# Patient Record
Sex: FEMALE | Race: WHITE
[De-identification: names, ages, dates, MRNs, and addresses within clinical notes are randomized per-mention and may not be internally consistent; named-entity substitution may affect disease eponyms.]

---

## 2017-05-04 ENCOUNTER — HOSPITAL ENCOUNTER (EMERGENCY)
Dept: HOSPITAL 62 - ER | Age: 57
LOS: 1 days | Discharge: HOME | End: 2017-05-05
Payer: COMMERCIAL

## 2017-05-04 DIAGNOSIS — R06.02: ICD-10-CM

## 2017-05-04 DIAGNOSIS — R05: ICD-10-CM

## 2017-05-04 DIAGNOSIS — F17.210: ICD-10-CM

## 2017-05-04 DIAGNOSIS — J20.9: Primary | ICD-10-CM

## 2017-05-04 PROCEDURE — 99285 EMERGENCY DEPT VISIT HI MDM: CPT

## 2017-05-04 PROCEDURE — 94640 AIRWAY INHALATION TREATMENT: CPT

## 2017-05-04 PROCEDURE — 93005 ELECTROCARDIOGRAM TRACING: CPT

## 2017-05-04 PROCEDURE — 93010 ELECTROCARDIOGRAM REPORT: CPT

## 2017-05-04 PROCEDURE — 71010: CPT

## 2017-05-04 NOTE — ER DOCUMENT REPORT
ED Respiratory Problem





- General


Mode of Arrival: Ambulatory


Information source: Patient


TRAVEL OUTSIDE OF THE [place] IN LAST 30 DAYS: No





- HPI


Patient complains to provider of: Short of breath


Onset: Yesterday


Duration: Worse/persistent


Quality of pain: No pain


Short of Breath: Mild


Cough: Nonproductive


Associated symptoms: None


Similar symptoms previously: Yes





- General


Chief Complaint: Shortness Of Breath


Stated Complaint: DIFFICULTY BREATHING


Notes: 


Patient is a 57-year-old female that presents to the emergency department today 

with complaints of shortness of breath with a mild cough which began yesterday.

  Patient states annually she gets bronchitis with associated shortness of 

breath which presents in this fashion.  Patient states she has used prednisone 

with an albuterol inhaler in the past which has worked well for her in the 

past.  Patient denies any chest pain or history of cardiac disease. (CARLOS EVANS)





- Related Data


Allergies/Adverse Reactions: 


 





No Known Allergies Allergy (Verified 05/23/16 10:49)


 











Past Medical History





- General


Information source: Patient





- Social History


Smoking Status: Current Every Day Smoker


Cigarette use (# per day): Yes


Frequency of alcohol use: None


Drug Abuse: None


Lives with: Family


Family History: Reviewed & Not Pertinent


Patient has suicidal ideation: No


Patient has homicidal ideation: No





- Past Medical History


Cardiac Medical History: Reports: Hx Hypertension


Past Surgical History: Reports: Hx Cholecystectomy, Hx Oral Surgery, Hx Tubal 

Ligation





- Immunizations


Hx Diphtheria, Pertussis, Tetanus Vaccination: Yes





Review of Systems





- Review of Systems


Constitutional: No symptoms reported


EENT: No symptoms reported


Cardiovascular: denies: Chest pain


Respiratory: See HPI, Cough, Short of breath


Gastrointestinal: No symptoms reported


Genitourinary: No symptoms reported


Female Genitourinary: No symptoms reported


Musculoskeletal: No symptoms reported


Skin: No symptoms reported


Hematologic/Lymphatic: No symptoms reported


Neurological/Psychological: No symptoms reported


-: Yes All other systems reviewed and negative





Physical Exam





- Vital signs


Vitals: 


 











Temp Pulse Resp BP Pulse Ox


 


 98.3 F   93   18   187/94 H  98 


 


 05/04/17 21:59  05/04/17 21:59  05/04/17 21:59  05/04/17 21:59  05/04/17 21:59














- Notes


Notes: 


Physical Exam:


 


General: Alert, appears well. 


 


HEENT: Normocephalic. Atraumatic. PERRL. Extraocular movements intact. 

Oropharynx clear. TMs are clear bilaterally. 


 


Neck: Supple. Non-tender.


 


Respiratory: No respiratory distress.  Expiratory wheezing bilaterally.  





Cardiovascular: Regular rate and rhythm. 


 


Abdominal: Normal Inspection. Non-tender. No distension. Normal Bowel Sounds. 


 


Back: Non-tender. No deformity or step off.


 


Extremities: Moves all four extremities.


Upper extremities: Normal inspection. Normal ROM.  


Lower extremities: Normal inspection. No edema. Normal ROM.


 


Neurological: Normal cognition. AAOx4. Normal speech.  


 


Psychological: Normal affect. Normal Mood. 


 


Skin: Warm. Dry. Normal color.  (CARLOS EVANS)





Course





- Re-evaluation


Re-evalutation: 


05/05/17 02:09


Female smoker presents the emergency room chief complaint of cough and 

shortness of breath. She says started about 3-4 days ago mild cough 

nonproductive of sputum. Says she can hear herself wheezing it gets worse when 

she ambulates. She vehemently denies any cardiac history this is nonexertional 

in nature. She denies any chest pain pressure shortness of breath on exertion 

lower extremity edema calf tenderness swelling history DVT or pulmonary emboli. 

No fevers chills chest pain pressure back pain abdominal pain nausea vomiting 

or diarrhea. On examination she is afebrile not hypoxic in no acute respiratory 

distress does have expiratory wheezes. Chest x-ray is negative for acute 

pathology improvement with albuterol and prednisone. Does have a history of 

smoking is not been diagnosed with COPD but is had bronchitis in the past. When 

ahead and started her on prednisone and albuterol inhaler gave her primary care 

physician follow-up in 2-3 days and discussed reasons for ED return sooner





05/05/17 02:53


 (MICHELLE OSPINA)





- Vital Signs


Vital signs: 


 











Temp Pulse Resp BP Pulse Ox


 


 98.3 F   88   20   144/71 H  98 


 


 05/04/17 21:59  05/05/17 01:46  05/05/17 01:46  05/05/17 01:46  05/04/17 21:59














- EKG Interpretation by Me


Additional EKG results interpreted by me: 





05/05/17 00:59


EKG interpreted by myself to reveal sinus rhythm at 76 bpm no acute ST segment 

elevation or depression (MICHELLE OSPINA)





Discharge





- Discharge


Clinical Impression: 


 Bronchitis acute, tobacco abuse





Condition: Stable


Disposition: HOME, SELF-CARE


Additional Instructions: 


Bronchitis





     You have acute bronchitis.  This disease is an infection or inflammation 

of the air passageways in your lungs.  Symptoms usually include cough, low 

grade fever, shortness of breath, and wheezing.  The cough usually persists for 

a couple of weeks.


     Most cases of bronchitis get better without antibiotics.  We prescribe 

antibiotics when we believe bacteria are damaging your airways, or if there's 

high risk the bronchitis will worsen into pneumonia.


     Increase your fluid intake. A cool mist humidifier may make your lungs 

more comfortable.  An expectorant (cough medicine that loosens phlegm) can 

help.  If you smoke, STOP!!!  Recovery from bronchitis can be somewhat slow, 

but you should see improvement within a day or two.


     Repeated episodes of bronchitis may result in lung damage -- for example, 

chronic bronchitis, recurrent pneumonias, or emphysema.


     Call the doctor if you develop increasing fever, shortness of breath, 

chest pain, bloody sputum, or otherwise worsen.  If you have not improved at 

all after several days, contact the physician.








Prescriptions: 


Albuterol Sulfate [Proair HFA Inhalation Aerosol 8.5 gm MDI] 2 puff IH Q4H PRN #

1 mdi


 PRN Reason: 


Prednisone [Deltasone 20 mg Tablet] 3 tab PO DAILY 5 Days


Forms:  Smoking Cessation Education


Referrals: 


[facility] [Provider Group] (Call in a.m. for appointment to be 

seen in follow-up in 3-4 days return for increasing worsening or new symptoms)


Scribe Attestation: 





05/05/17 00:21


I personally performed the services described in the documentation reviewed the 

documentation recorded by my scribe in my presence and it accurately and 

completely records my words and actions (MICHELLE OSPINA)





Scribe Documentation





- Scribe


Written by Khadra:: Khadra Romero, 5/5/2017 0228


acting as scribe for :: Bernardino

## 2017-05-05 VITALS — SYSTOLIC BLOOD PRESSURE: 144 MMHG | DIASTOLIC BLOOD PRESSURE: 71 MMHG

## 2017-10-11 ENCOUNTER — HOSPITAL ENCOUNTER (EMERGENCY)
Dept: HOSPITAL 62 - ER | Age: 57
Discharge: HOME | End: 2017-10-11
Payer: COMMERCIAL

## 2017-10-11 VITALS — SYSTOLIC BLOOD PRESSURE: 153 MMHG | DIASTOLIC BLOOD PRESSURE: 108 MMHG

## 2017-10-11 DIAGNOSIS — R07.9: ICD-10-CM

## 2017-10-11 DIAGNOSIS — J40: Primary | ICD-10-CM

## 2017-10-11 DIAGNOSIS — R06.02: ICD-10-CM

## 2017-10-11 DIAGNOSIS — R05: ICD-10-CM

## 2017-10-11 DIAGNOSIS — F17.200: ICD-10-CM

## 2017-10-11 PROCEDURE — 99283 EMERGENCY DEPT VISIT LOW MDM: CPT

## 2017-10-11 NOTE — ER DOCUMENT REPORT
ED General





- General


Chief Complaint: Cough


Stated Complaint: COUGH DIFFICULTY BREATHING


Time Seen by Provider: 10/11/17 12:34


Notes: 





58 yo female with hx/o bronchitis, HTN presents to ED c/o cough, chest 

tightness and shortness of breath.  Pt reports she gets bronchitis 1-2x/y.  

These symptoms are familiar.  pt denies any chest pain.  no fever.  feels 

otherwise OK.  + smoker


TRAVEL OUTSIDE OF THE [place] IN LAST 30 DAYS: No





- HPI


Onset: Other - 2days


Onset/Duration: Gradual, Persistent


Quality of pain: No pain


Associated symptoms: Nonproductive cough, Shortness of breath.  denies: Sinus 

pain/drainage


Exacerbated by: Movement, Walking


Relieved by: Denies


Similar symptoms previously: Yes


Recently seen / treated by doctor: No





- Related Data


Allergies/Adverse Reactions: 


 





No Known Allergies Allergy (Verified 10/11/17 11:21)


 











Past Medical History





- General


Information source: Patient





- Social History


Smoking Status: Current Some Day Smoker


Chew tobacco use (# tins/day): No


Frequency of alcohol use: None


Drug Abuse: None


Lives with: Family


Family History: Reviewed & Not Pertinent





- Past Medical History


Cardiac Medical History: Reports: Hx Hypertension


Pulmonary Medical History: Reports: Hx Bronchitis


Renal/ Medical History: Denies: Hx Peritoneal Dialysis


Past Surgical History: Reports: Hx Cholecystectomy, Hx Oral Surgery - jaw, Hx 

Orthopedic Surgery - back, Hx Tubal Ligation





- Immunizations


Hx Diphtheria, Pertussis, Tetanus Vaccination: Yes





Review of Systems





- Review of Systems


Constitutional: No symptoms reported


EENT: No symptoms reported


Cardiovascular: No symptoms reported


Respiratory: See HPI, Cough


Gastrointestinal: No symptoms reported


Genitourinary: No symptoms reported


Female Genitourinary: No symptoms reported


Musculoskeletal: No symptoms reported


Skin: No symptoms reported


Hematologic/Lymphatic: No symptoms reported


Neurological/Psychological: No symptoms reported


-: Yes All other systems reviewed and negative





Physical Exam





- Vital signs


Vitals: 


 











Temp Pulse Resp BP Pulse Ox


 


 99.1 F   112 H  19   153/108 H  94 


 


 10/11/17 11:18  10/11/17 11:18  10/11/17 11:18  10/11/17 11:18  10/11/17 11:18











Interpretation: Normal





- General


General appearance: Appears well, Alert





- HEENT


Head: Normocephalic, Atraumatic


Eyes: Normal


Pupils: PERRL





- Respiratory


Respiratory status: No respiratory distress


Chest status: Nontender


Breath sounds: Decreased air movement, Nonproductive cough, Wheezing


Chest palpation: Normal





- Cardiovascular


Rhythm: Regular


Heart sounds: Normal auscultation


Murmur: No





- Abdominal


Inspection: Normal


Distension: No distension


Bowel sounds: Normal


Tenderness: Nontender


Organomegaly: No organomegaly





- Back


Back: Normal, Nontender





- Extremities


General upper extremity: Normal inspection, Nontender, Normal color, Normal ROM

, Normal temperature


General lower extremity: Normal inspection, Nontender, Normal color, Normal ROM

, Normal temperature, Normal weight bearing.  No: Simin's sign





- Neurological


Neuro grossly intact: Yes


Cognition: Normal


Orientation: AAOx4


Margot Coma Scale Eye Opening: Spontaneous


Margot Coma Scale Verbal: Oriented


Redding Coma Scale Motor: Obeys Commands


Margot Coma Scale Total: 15


Speech: Normal


Motor strength normal: LUE, RUE, LLE, RLE


Sensory: Normal





- Psychological


Associated symptoms: Normal affect, Normal mood





- Skin


Skin Temperature: Warm


Skin Moisture: Dry


Skin Color: Normal





Course





- Re-evaluation


Re-evalutation: 





10/11/17 12:51


pt is a 58 yo heavy smoker with  complaints of cough and shortness of breath.  

symptoms started about 2days ago with mild cough,nonproductive.  Pt denies 

cardiac history.  She denies any chest pain pressure, lower extremity edema, 

calf tenderness, swelling or history DVT or pulmonary emboli. No fevers, chills

, chest pain or pressure,  back pain,  abdominal pain,  nausea vomiting or 

diarrhea. On examination she is afebrile not hypoxic in no acute respiratory 

distress does have expiratory wheezes. Pt offered a chest xray and neb treatment

, but she declines both.  says this is familiar to her and she is catching it 

early.  Pt does have a history of smoking but is making concerted effort to 

quit.  Down from 1ppd to 1ppweek,  Pt has not been diagnosed with COPD but has 

had bronchitis many times in the past. Will treat with oral steroid, inhaled 

bronchidilator.  I do not feel there is a current infectious process, but will 

write for a course of antibiotic which pt is instructed to start if condition 

worsens.  pt acknowledges understanding and is agreeable with plan.  pt is 

stable for discharge and f/u with pcm.  discussed reasons for ED return 











- Vital Signs


Vital signs: 


 











Temp Pulse Resp BP Pulse Ox


 


 99.1 F   112 H  19   153/108 H  94 


 


 10/11/17 11:18  10/11/17 11:18  10/11/17 11:18  10/11/17 11:18  10/11/17 11:18














Discharge





- Discharge


Clinical Impression: 


 Bronchitis





Condition: Stable


Disposition: HOME, SELF-CARE


Instructions:  Antibiotic Therapy ([facility]), Bronchitis With Bronchospasm (Wheezing

) ([facility]), Inhaled Bronchodilators ([facility]), Steroid Medication


Additional Instructions: 


You are being treated for bronchitis


Take medications as prescribed


Hold antibiotic and take only if your condition worsens


Humidified air


continue your efforts to stop smoking


Prescriptions: 


Albuterol Sulfate [Proair HFA Inhalation Aerosol 8.5 gm MDI] 2 puff IH Q4H PRN #

1 mdi


 PRN Reason: 


Azithromycin [Zithromax 250 mg Tablet] 250 mg PO ASDIR #6 tablet


Prednisone [Deltasone 10 mg Tablet] 10 mg PO ASDIR PRN #21 tablet


 PRN Reason: 


Forms:  Elevated Blood Pressure

## 2017-10-12 ENCOUNTER — HOSPITAL ENCOUNTER (OUTPATIENT)
Dept: HOSPITAL 62 - ER | Age: 57
Setting detail: OBSERVATION
LOS: 1 days | Discharge: HOME | End: 2017-10-13
Attending: INTERNAL MEDICINE | Admitting: INTERNAL MEDICINE
Payer: SELF-PAY

## 2017-10-12 DIAGNOSIS — F17.210: ICD-10-CM

## 2017-10-12 DIAGNOSIS — Z79.52: ICD-10-CM

## 2017-10-12 DIAGNOSIS — J96.01: ICD-10-CM

## 2017-10-12 DIAGNOSIS — R73.9: ICD-10-CM

## 2017-10-12 DIAGNOSIS — J44.1: Primary | ICD-10-CM

## 2017-10-12 DIAGNOSIS — R00.0: ICD-10-CM

## 2017-10-12 DIAGNOSIS — I16.0: ICD-10-CM

## 2017-10-12 DIAGNOSIS — Z98.51: ICD-10-CM

## 2017-10-12 DIAGNOSIS — F41.8: ICD-10-CM

## 2017-10-12 DIAGNOSIS — Z90.49: ICD-10-CM

## 2017-10-12 DIAGNOSIS — J34.89: ICD-10-CM

## 2017-10-12 DIAGNOSIS — T38.0X5A: ICD-10-CM

## 2017-10-12 DIAGNOSIS — Z79.899: ICD-10-CM

## 2017-10-12 LAB
ALBUMIN SERPL-MCNC: 4.7 G/DL (ref 3.5–5)
ALP SERPL-CCNC: 206 U/L (ref 38–126)
ALT SERPL-CCNC: 31 U/L (ref 9–52)
ANION GAP SERPL CALC-SCNC: 14 MMOL/L (ref 5–19)
APPEARANCE UR: CLEAR
APTT BLD: 33 SEC (ref 23.5–35.8)
AST SERPL-CCNC: 33 U/L (ref 14–36)
BASE EXCESS BLDV CALC-SCNC: -2 MMOL/L
BASOPHILS # BLD AUTO: 0.1 10^3/UL (ref 0–0.2)
BASOPHILS NFR BLD AUTO: 0.7 % (ref 0–2)
BILIRUB DIRECT SERPL-MCNC: 0.4 MG/DL (ref 0–0.4)
BILIRUB SERPL-MCNC: 0.5 MG/DL (ref 0.2–1.3)
BILIRUB UR QL STRIP: NEGATIVE
BUN SERPL-MCNC: 6 MG/DL (ref 7–20)
CALCIUM: 9.6 MG/DL (ref 8.4–10.2)
CHLORIDE SERPL-SCNC: 108 MMOL/L (ref 98–107)
CO2 SERPL-SCNC: 27 MMOL/L (ref 22–30)
CREAT SERPL-MCNC: 0.81 MG/DL (ref 0.52–1.25)
EOSINOPHIL # BLD AUTO: 0 10^3/UL (ref 0–0.6)
EOSINOPHIL NFR BLD AUTO: 0.1 % (ref 0–6)
ERYTHROCYTE [DISTWIDTH] IN BLOOD BY AUTOMATED COUNT: 14.2 % (ref 11.5–14)
GLUCOSE SERPL-MCNC: 144 MG/DL (ref 75–110)
GLUCOSE UR STRIP-MCNC: NEGATIVE MG/DL
HCO3 BLDV-SCNC: 24 MMOL/L (ref 20–32)
HCT VFR BLD CALC: 45 % (ref 36–47)
HGB BLD-MCNC: 15.4 G/DL (ref 12–15.5)
HGB HCT DIFFERENCE: 1.2
KETONES UR STRIP-MCNC: NEGATIVE MG/DL
LYMPHOCYTES # BLD AUTO: 0.8 10^3/UL (ref 0.5–4.7)
LYMPHOCYTES NFR BLD AUTO: 7.3 % (ref 13–45)
MCH RBC QN AUTO: 32 PG (ref 27–33.4)
MCHC RBC AUTO-ENTMCNC: 34.2 G/DL (ref 32–36)
MCV RBC AUTO: 94 FL (ref 80–97)
MONOCYTES # BLD AUTO: 0.9 10^3/UL (ref 0.1–1.4)
MONOCYTES NFR BLD AUTO: 7.4 % (ref 3–13)
NEUTROPHILS # BLD AUTO: 9.7 10^3/UL (ref 1.7–8.2)
NEUTS SEG NFR BLD AUTO: 84.5 % (ref 42–78)
NITRITE UR QL STRIP: NEGATIVE
PCO2 BLDV: 45.2 MMHG (ref 35–63)
PH BLDV: 7.34 [PH] (ref 7.3–7.42)
PH UR STRIP: 6 [PH] (ref 5–9)
POTASSIUM SERPL-SCNC: 3.7 MMOL/L (ref 3.6–5)
PROT SERPL-MCNC: 8.4 G/DL (ref 6.3–8.2)
PROT UR STRIP-MCNC: NEGATIVE MG/DL
PROTHROMBIN TIME: 12.4 SEC (ref 11.4–15.4)
PROTHROMBIN TIME: 13.2 SEC (ref 11.4–15.4)
RBC # BLD AUTO: 4.81 10^6/UL (ref 3.72–5.28)
SODIUM SERPL-SCNC: 149 MMOL/L (ref 137–145)
SP GR UR STRIP: 1
UROBILINOGEN UR-MCNC: NEGATIVE MG/DL (ref ?–2)
WBC # BLD AUTO: 11.5 10^3/UL (ref 4–10.5)

## 2017-10-12 PROCEDURE — 81001 URINALYSIS AUTO W/SCOPE: CPT

## 2017-10-12 PROCEDURE — 36415 COLL VENOUS BLD VENIPUNCTURE: CPT

## 2017-10-12 PROCEDURE — 85025 COMPLETE CBC W/AUTO DIFF WBC: CPT

## 2017-10-12 PROCEDURE — 99406 BEHAV CHNG SMOKING 3-10 MIN: CPT

## 2017-10-12 PROCEDURE — 93005 ELECTROCARDIOGRAM TRACING: CPT

## 2017-10-12 PROCEDURE — 99291 CRITICAL CARE FIRST HOUR: CPT

## 2017-10-12 PROCEDURE — 87040 BLOOD CULTURE FOR BACTERIA: CPT

## 2017-10-12 PROCEDURE — 96360 HYDRATION IV INFUSION INIT: CPT

## 2017-10-12 PROCEDURE — 84100 ASSAY OF PHOSPHORUS: CPT

## 2017-10-12 PROCEDURE — 71010: CPT

## 2017-10-12 PROCEDURE — 87086 URINE CULTURE/COLONY COUNT: CPT

## 2017-10-12 PROCEDURE — 82803 BLOOD GASES ANY COMBINATION: CPT

## 2017-10-12 PROCEDURE — 80061 LIPID PANEL: CPT

## 2017-10-12 PROCEDURE — 71275 CT ANGIOGRAPHY CHEST: CPT

## 2017-10-12 PROCEDURE — HZ31ZZZ INDIVIDUAL COUNSELING FOR SUBSTANCE ABUSE TREATMENT, BEHAVIORAL: ICD-10-PCS | Performed by: EMERGENCY MEDICINE

## 2017-10-12 PROCEDURE — 94640 AIRWAY INHALATION TREATMENT: CPT

## 2017-10-12 PROCEDURE — 85730 THROMBOPLASTIN TIME PARTIAL: CPT

## 2017-10-12 PROCEDURE — 93010 ELECTROCARDIOGRAM REPORT: CPT

## 2017-10-12 PROCEDURE — G0378 HOSPITAL OBSERVATION PER HR: HCPCS

## 2017-10-12 PROCEDURE — 3E0F7GC INTRODUCTION OF OTHER THERAPEUTIC SUBSTANCE INTO RESPIRATORY TRACT, VIA NATURAL OR ARTIFICIAL OPENING: ICD-10-PCS | Performed by: EMERGENCY MEDICINE

## 2017-10-12 PROCEDURE — 84443 ASSAY THYROID STIM HORMONE: CPT

## 2017-10-12 PROCEDURE — 83605 ASSAY OF LACTIC ACID: CPT

## 2017-10-12 PROCEDURE — 87070 CULTURE OTHR SPECIMN AEROBIC: CPT

## 2017-10-12 PROCEDURE — 87205 SMEAR GRAM STAIN: CPT

## 2017-10-12 PROCEDURE — 80053 COMPREHEN METABOLIC PANEL: CPT

## 2017-10-12 PROCEDURE — 85610 PROTHROMBIN TIME: CPT

## 2017-10-12 PROCEDURE — 83880 ASSAY OF NATRIURETIC PEPTIDE: CPT

## 2017-10-12 PROCEDURE — 83735 ASSAY OF MAGNESIUM: CPT

## 2017-10-12 RX ADMIN — METHYLPREDNISOLONE SODIUM SUCCINATE SCH MG: 40 INJECTION, POWDER, FOR SOLUTION INTRAMUSCULAR; INTRAVENOUS at 21:51

## 2017-10-12 RX ADMIN — IPRATROPIUM BROMIDE AND ALBUTEROL SULFATE SCH ML: 2.5; .5 SOLUTION RESPIRATORY (INHALATION) at 20:54

## 2017-10-12 NOTE — ER DOCUMENT REPORT
ED General





- General


Chief Complaint: Breathing Difficulty


Stated Complaint: DIFFICULTY BREATHING


Time Seen by Provider: 10/12/17 14:23


Mode of Arrival: Ambulatory


Information source: Patient


Notes: 





57-year-old female history of smoking presents with complaints of shortness of 

breath and wheezing productive cough of the day duration.  Patient was seen 

here yesterday discharged home with steroids and breathing treatment


Patient notes she has difficulty ambulating due to shortness of breath


TRAVEL OUTSIDE OF THE [place] IN LAST 30 DAYS: No





- HPI


Onset: Last week


Onset/Duration: Worse


Quality of pain: Achy


Severity: Mild


Pain Level: 1


Associated symptoms: Productive cough, Hurts to breath, Shortness of breath


Exacerbated by: Walking, Coughing


Relieved by: Denies


Similar symptoms previously: Yes


Recently seen / treated by doctor: Yes





- Related Data


Allergies/Adverse Reactions: 


 





No Known Allergies Allergy (Verified 10/12/17 14:08)


 











Past Medical History





- Social History


Smoking Status: Current Every Day Smoker


Cigarette use (# per day): Yes


Chew tobacco use (# tins/day): No


Smoking Education Provided: Yes - Patient counselled regarding cessation for 4 

minutes


Family History: Reviewed & Not Pertinent


Patient has suicidal ideation: No





- Past Medical History


Cardiac Medical History: Reports: Hx Hypertension


Pulmonary Medical History: Reports: Hx Bronchitis


Renal/ Medical History: Denies: Hx Peritoneal Dialysis


Past Surgical History: Reports: Hx Cholecystectomy, Hx Oral Surgery - jaw, Hx 

Orthopedic Surgery - back, Hx Tubal Ligation





- Immunizations


Hx Diphtheria, Pertussis, Tetanus Vaccination: Yes





Review of Systems





- Review of Systems


Notes: 





REVIEW OF SYSTEMS:


CONSTITUTIONAL :  Denies fever,  chills, or sweats.  Denies recent illness.


EENT:   Denies eye, ear, throat, or mouth pain or symptoms.  Denies nasal or 

sinus congestion or discharge.  Denies throat, tongue, or mouth swelling or 

difficulty swallowing.


CARDIOVASCULAR:  Denies chest pain.  Denies palpitations or racing or irregular 

heart beat.  Denies ankle edema.


RESPIRATORY: Admits shortness breath difficulty breathing


GASTROINTESTINAL:  Denies abdominal pain or distention.  Denies nausea, vomiting

, or diarrhea.  Denies blood in vomitus, stools, or per rectum.  Denies black, 

tarry stools.  Denies constipation. 


GENITOURINARY:  Denies difficulty urinating, painful urination, burning, 

frequency, blood in urine, or discharge.


FEMALE  GENITOURINARY:  Denies vaginal bleeding, heavy or abnormal periods, 

irregular periods.  Denies vaginal discharge or odor. 


MUSCULOSKELETAL:  Denies back or neck pain or stiffness.  Denies joint pain or 

swelling.


SKIN:   Denies rash, lesions or sores.


HEMATOLOGIC :   Denies easy bruising or bleeding.


LYMPHATIC:  Denies swollen, enlarged glands.


NEUROLOGICAL:  Denies confusion or altered mental status.  Denies passing out 

or loss of consciousness.  Denies dizziness or lightheadedness.  Denies 

headache.  Denies weakness or paralysis or loss of use of either side.  Denies 

problems with gait or speech.  Denies sensory loss, numbness, or tingling.  

Denies seizures.


PSYCHIATRIC:  Denies anxiety or stress.  Denies depression, suicidal ideation, 

or homicidal ideation.





ALL OTHER SYSTEMS REVIEWED AND NEGATIVE.











PHYSICAL EXAMINATION:





GENERAL: Well-appearing, well-nourished and in no acute distress.





HEAD: Atraumatic, normocephalic.





EYES: Pupils equal round and reactive to light, extraocular movements intact, 

conjunctiva are normal.





ENT: Nares patent, oropharynx clear without exudates.  Moist mucous membranes.





NECK: Normal range of motion, supple without lymphadenopathy





LUNGS: Coarse wheezing all throughout.





HEART: Tachycardic





ABDOMEN: Soft, nontender, nondistended abdomen.  No guarding, no rebound.  No 

masses appreciated.





Female : deferred





Musculoskeletal: Normal range of motion, no pitting or edema.  No cyanosis.





NEUROLOGICAL: Cranial nerves grossly intact.  Normal speech, normal gait.  

Normal sensory, motor exams





PSYCH: Normal mood, normal affect.





SKIN: Warm, Dry, normal turgor, no rashes or lesions noted.

















Dictation was performed using Dragon voice recognition software





Physical Exam





- Vital signs


Vitals: 


 











Temp Pulse Resp BP Pulse Ox


 


 98.2 F   130 H  20   191/107 H  89 L


 


 10/12/17 14:08  10/12/17 14:08  10/12/17 14:08  10/12/17 14:08  10/12/17 14:08














Course





- Re-evaluation


Re-evalutation: 





10/12/17 14:40


Patient will be given breathing treatments I believe this is still bronchitis 

lab work pending CTA has been ordered given the level of her tachycardia


10/12/17 15:59


Patient notes some improvement after breathing treatments however continues to 

be tachycardic now in the 130s





- Vital Signs


Vital signs: 


 











Temp Pulse Resp BP Pulse Ox


 


 98.2 F   130 H  20   180/86 H  93 


 


 10/12/17 14:08  10/12/17 14:08  10/12/17 17:01  10/12/17 17:00  10/12/17 17:01














- Laboratory


Result Diagrams: 


 10/12/17 14:30





 10/12/17 14:30


Laboratory results interpreted by me: 


 











  10/12/17 10/12/17 10/12/17





  14:30 14:30 14:46


 


WBC  11.5 H  


 


RDW  14.2 H  


 


Seg Neutrophils %  84.5 H  


 


Lymphocytes %  7.3 L  


 


Absolute Neutrophils  9.7 H  


 


Sodium   149.0 H 


 


Chloride   108 H 


 


BUN   6 L 


 


Glucose   144 H 


 


Alkaline Phosphatase   206 H 


 


Total Protein   8.4 H 


 


Ur Leukocyte Esterase    TRACE H














- Diagnostic Test


Radiology reviewed: Image reviewed, Reports reviewed





Critical Care Note





- Critical Care Note


Total time excluding time spent on procedures (mins): 38


Comments: 





38 minutes of critical care time spent in direct contact evaluating and 

reevaluating the patient, treating symptoms, reviewing labs and studies and 

speaking with family  and consultants excluding any procedures





Discharge





- Discharge


Clinical Impression: 


 COPD exacerbation, Tachycardia, Hypoxemia





Condition: Stable


Disposition: ADMITTED AS INPATIENT


Admitting Provider: Hospitalist


Unit Admitted: Telemetry

## 2017-10-12 NOTE — RADIOLOGY REPORT (SQ)
EXAM DESCRIPTION:  CHEST SINGLE VIEW



COMPLETED DATE/TIME:  10/12/2017 2:51 pm



REASON FOR STUDY:  bed 17 sepsis protocol



COMPARISON:  5/4/2017



EXAM PARAMETERS:  NUMBER OF VIEWS: One view.

TECHNIQUE: Single frontal radiographic view of the chest acquired.

RADIATION DOSE: NA

LIMITATIONS: None.



FINDINGS:  LUNGS AND PLEURA: No opacities, masses or pneumothorax. No pleural effusion.

MEDIASTINUM AND HILAR STRUCTURES: No masses.  Contour normal.

HEART AND VASCULAR STRUCTURES: Heart normal in size.  Normal vasculature.

BONES: No acute findings.

HARDWARE: None in the chest.

OTHER: No other significant finding.



IMPRESSION:  NO ACUTE RADIOGRAPHIC FINDING IN THE CHEST.



TECHNICAL DOCUMENTATION:  JOB ID:  [id number]

## 2017-10-12 NOTE — RADIOLOGY REPORT (SQ)
EXAM DESCRIPTION:  CTA CHEST



COMPLETED DATE/TIME:  10/12/2017 3:48 pm



REASON FOR STUDY:  hypoxemia , tachy



COMPARISON:  None.



TECHNIQUE:  CT scan of the chest performed using helical scanning technique with dynamic intravenous 
contrast injection.  Images reviewed with lung, soft tissue and bone windows.  Reconstructed coronal 
and sagittal MPR images reviewed.

Additional 3 dimensional post-processing performed to develop Maximal Intensity Projection images (MI
P).  All images stored on PACS.

All CT scanners at this facility use dose modulation, iterative reconstruction, and/or weight based d
osing when appropriate to reduce radiation dose to as low as reasonably achievable (ALARA).

[facility]: Dose Right  [facility]: CareDose    [facility]: Dose Right    [facility]: Teradose 4D    [facility]: Smart Technologies



CONTRAST TYPE AND DOSE:  contrast/concentration: Isovue 370.00 mg/ml; Total Contrast Delivered: 61.0 
ml; Total Saline Delivered: 70.0 ml



RENAL FUNCTION:  Creatinine 0.8 BUN 6



RADIATION DOSE:  Up-to-date CT equipment and radiation dose reduction techniques were employed. CTDIv
ol: 14.3 - 16.5 mGy. DLP: 548 mGy-cm. .



LIMITATIONS:  None.



FINDINGS:  LUNGS AND PLEURA: Centrilobular emphysematous changes are present in the upper lobes.  The
re is no pneumothorax, infiltrate, or effusion.  There is no mass.

AORTA AND GREAT VESSELS: No aneurysm.  Contrast bolus not optimized for the aorta.

HEART: No pericardial effusion.

PULMONARY ARTERIES: No emboli visualized in the main pulmonary arteries or the segmental branches.

HILAR AND MEDIASTINAL STRUCTURES: No identified masses or abnormal nodes.

HARDWARE: None in the chest.

UPPER ABDOMEN: No significant findings.  Limited exam.

THYROID AND OTHER SOFT TISSUES: No masses.  No adenopathy.

BONES: No acute or significant finding.

3D MIPS: Confirm above findings.

OTHER: No other significant finding.



IMPRESSION:  NO PULMONARY EMBOLI.  CENTRILOBULAR EMPHYSEMATOUS CHANGES ARE PRESENT IN THE UPPER LOBES
.



COMMENT:  Quality ID # [id number]: Final reports with documentation of one or more dose reduction techniques
 (e.g., Automated exposure control, adjustment of the mA and/or kV according to patient size, use of 
iterative reconstruction technique)



TECHNICAL DOCUMENTATION:  JOB ID:  [id number]

 2011 [employer]- All Rights Reserved

## 2017-10-12 NOTE — PDOC H&P
History of Present Illness


Admission Date/PCP: 


Date of admission: 10/12/2017


Primary care MD: Sun Zuluaga MD 





Patient complains of: Shortness of Breath


History of Present Illness: 


NASIM OTOOLE is a 57 year old female who presented to the emergency room with 

shortness of breath.  She was seen in the emergency room yesterday and 

diagnosed with acute bronchitis and sent out steroids.  Unfortunately her 

shortness of breath worsened and she represented to the emergency room where 

she was found to be hypoxic.  In spite of treatment in the emergency room the 

patient remains hypoxic.  She is having accelerated hypertension and 

tachycardia and she was referred for admission.  At the time of my visit the 

patient states she is feeling much better on oxygen and after receiving 

steroids.








Past Medical History


Cardiac Medical History: Reports: Hypertension


Pulmonary Medical History: Reports: Bronchitis


EENT Medical History: Reports: None


Neurological Medical History: Reports: None


Endocrine Medical History: Reports: None


Renal/ Medical History: Reports: None


Malignancy Medical History: Reports: None


GI Medical History: Reports: None


Musculoskeltal Medical History: Reports: None


Skin Medical History: Reports: None


Psychiatric Medical History: Reports: Depression, Tobacco Dependency


Traumatic Medical History: Reports: None


Hematology: Reports: None


Infectious Medical History: Reports: None





Past Surgical History


Past Surgical History: Reports: Cholecystectomy, Orthopedic Surgery - back, 

Tubal Ligation





Social History


Information Source: Patient


Smoking Status: Current Every Day Smoker


Frequency of Alcohol Use: Social


Drugs: None





Family History


Family History: Reviewed & Not Pertinent


Parental Family History Reviewed: Yes


Children Family History Reviewed: No


Sibling(s) Family History Reviewed.: No





Medication/Allergy


Home Medications: 








Citalopram Hydrobromide [Celexa 40 mg Tablet] 1 tab PO DAILY 05/23/16 


Lisinopril 20 mg PO DAILY 05/23/16 


Prednisone [Deltasone 20 mg Tablet] 3 tab PO DAILY #8 tablet 05/23/16 


Azithromycin [Zithromax 250 mg Tablet] 250 mg PO ASDIR PRN #6 tablet 11/15/16 


Ipratropium Bromide [Atrovent Hfa Inhalation Aerosol 12.9 gm Mdi] 2 puff IH QID 

PRN #1 inhaler 11/15/16 


Prednisone 20 mg PO TID 3 Days  tablet 11/15/16 


Albuterol Sulfate [Proair HFA Inhalation Aerosol 8.5 gm MDI] 2 puff IH Q4H PRN #

1 mdi 05/05/17 


Prednisone [Deltasone 20 mg Tablet] 3 tab PO DAILY 5 Days  tablet 05/05/17 


Albuterol Sulfate [Proair HFA Inhalation Aerosol 8.5 gm MDI] 2 puff IH Q4H PRN #

1 mdi 10/11/17 


Azithromycin [Zithromax 250 mg Tablet] 250 mg PO ASDIR #6 tablet 10/11/17 


Prednisone [Deltasone 10 mg Tablet] 10 mg PO ASDIR PRN #21 tablet 10/11/17 








Allergies/Adverse Reactions: 


 





No Known Allergies Allergy (Verified 10/12/17 14:08)


 











Review of Systems


Constitutional: PRESENT: fatigue, headache(s).  ABSENT: anorexia, chills, fever(

s), night sweats, weakness


Eyes: ABSENT: visual disturbances


Ears: ABSENT: hearing changes


Nose, Mouth, and Throat: PRESENT: headache(s).  ABSENT: mouth pain, sore throat


Cardiovascular: ABSENT: chest pain, dyspnea on exertion, orthropnea, 

palpitations


Respiratory: PRESENT: cough, dyspnea.  ABSENT: hemoptysis


Gastrointestinal: ABSENT: abdominal pain, constipation, diarrhea, dysphagia, 

heartburn, nausea, vomiting


Genitourinary: ABSENT: difficulty urinating, dysuria, hematuria, nocturia


Musculoskeletal: PRESENT: back pain.  ABSENT: joint swelling, muscle weakness


Integumentary: ABSENT: diaphoresis, erythema, lesions, pruritus, rash


Neurological: ABSENT: dizziness, lack of coordination, syncope, weakness


Psychiatric: PRESENT: anxiety, depression


Endocrine: ABSENT: cold intolerance, heat intolerance


Hematologic/Lymphatic: ABSENT: easy bleeding, easy bruising, lymphadenopathy


Allergic/Immunologic: PRESENT: seasonal rhinorrhea





Physical Exam


Vital Signs: 


 











Temp Pulse Resp BP Pulse Ox


 


 98.2 F   130 H  20   180/86 H  93 


 


 10/12/17 14:08  10/12/17 14:08  10/12/17 17:01  10/12/17 17:00  10/12/17 17:01








 Intake & Output











 10/11/17 10/12/17 10/13/17





 06:59 06:59 06:59


 


Weight   49.3 kg











General appearance: PRESENT: no acute distress, well-developed, well-nourished, 

other - She is receiving oxygen via nasal cannula


Eye exam: PRESENT: conjunctiva pink, EOMI, PERRLA.  ABSENT: scleral icterus


Mouth exam: PRESENT: moist, tongue midline


Neck exam: ABSENT: carotid bruit, JVD, lymphadenopathy, thyromegaly


Respiratory exam: PRESENT: decreased breath sounds, wheezes - She is diminished 

bilaterally with some expiratory wheezing throughout all lung fields 

bilaterally.


Cardiovascular exam: PRESENT: RRR, tachycardia.  ABSENT: systolic murmur


Pulses: PRESENT: normal dorsalis pedis pul


Vascular exam: PRESENT: normal capillary refill


GI/Abdominal exam: PRESENT: normal bowel sounds, soft.  ABSENT: distended, 

guarding, mass, organolmegaly, rebound, tenderness


Rectal exam: PRESENT: deferred


Extremities exam: PRESENT: full ROM.  ABSENT: calf tenderness, clubbing, pedal 

edema


Musculoskeletal exam: PRESENT: ambulatory


Neurological exam: PRESENT: alert, awake, oriented to person, oriented to place

, oriented to time, oriented to situation, CN II-XII grossly intact.  ABSENT: 

motor sensory deficit


Psychiatric exam: PRESENT: appropriate affect, normal mood.  ABSENT: homicidal 

ideation, suicidal ideation


Skin exam: PRESENT: dry, intact, warm.  ABSENT: cyanosis, rash





Results


Laboratory Results: 


 





 10/12/17 14:30 





 10/12/17 14:30 





 











  10/12/17 10/12/17 10/12/17





  14:30 14:30 14:30


 


WBC  11.5 H  


 


RBC  4.81  


 


Hgb  15.4  


 


Hct  45.0  


 


MCV  94  


 


MCH  32.0  


 


MCHC  34.2  


 


RDW  14.2 H  


 


Plt Count  314  


 


Seg Neutrophils %  84.5 H  


 


Lymphocytes %  7.3 L  


 


Monocytes %  7.4  


 


Eosinophils %  0.1  


 


Basophils %  0.7  


 


Absolute Neutrophils  9.7 H  


 


Absolute Lymphocytes  0.8  


 


Absolute Monocytes  0.9  


 


Absolute Eosinophils  0.0  


 


Absolute Basophils  0.1  


 


VBG pH   


 


VBG pCO2   


 


VBG HCO3   


 


VBG Base Excess   


 


Sodium   149.0 H 


 


Potassium   3.7 


 


Chloride   108 H 


 


Carbon Dioxide   27 


 


Anion Gap   14 


 


BUN   6 L 


 


Creatinine   0.81 


 


Est GFR ( Amer)   > 60 


 


Est GFR (Non-Af Amer)   > 60 


 


Glucose   144 H 


 


Lactic Acid    1.5


 


Calcium   9.6 


 


Total Bilirubin   0.5 


 


AST   33 


 


ALT   31 


 


Alkaline Phosphatase   206 H 


 


Total Protein   8.4 H 


 


Albumin   4.7 


 


Urine Color   


 


Urine Appearance   


 


Urine pH   


 


Ur Specific Gravity   


 


Urine Protein   


 


Urine Glucose (UA)   


 


Urine Ketones   


 


Urine Blood   


 


Urine Nitrite   


 


Ur Leukocyte Esterase   


 


Urine WBC (Auto)   


 


Urine RBC (Auto)   














  10/12/17 10/12/17





  14:30 14:46


 


WBC  


 


RBC  


 


Hgb  


 


Hct  


 


MCV  


 


MCH  


 


MCHC  


 


RDW  


 


Plt Count  


 


Seg Neutrophils %  


 


Lymphocytes %  


 


Monocytes %  


 


Eosinophils %  


 


Basophils %  


 


Absolute Neutrophils  


 


Absolute Lymphocytes  


 


Absolute Monocytes  


 


Absolute Eosinophils  


 


Absolute Basophils  


 


VBG pH  7.34 


 


VBG pCO2  45.2 


 


VBG HCO3  24.0 


 


VBG Base Excess  -2.0 


 


Sodium  


 


Potassium  


 


Chloride  


 


Carbon Dioxide  


 


Anion Gap  


 


BUN  


 


Creatinine  


 


Est GFR ( Amer)  


 


Est GFR (Non-Af Amer)  


 


Glucose  


 


Lactic Acid  


 


Calcium  


 


Total Bilirubin  


 


AST  


 


ALT  


 


Alkaline Phosphatase  


 


Total Protein  


 


Albumin  


 


Urine Color   STRAW


 


Urine Appearance   CLEAR


 


Urine pH   6.0


 


Ur Specific Gravity   1.004


 


Urine Protein   NEGATIVE


 


Urine Glucose (UA)   NEGATIVE


 


Urine Ketones   NEGATIVE


 


Urine Blood   NEGATIVE


 


Urine Nitrite   NEGATIVE


 


Ur Leukocyte Esterase   TRACE H


 


Urine WBC (Auto)   3


 


Urine RBC (Auto)   1











Impressions: 


 





Chest X-Ray  10/12/17 14:26


IMPRESSION:  NO ACUTE RADIOGRAPHIC FINDING IN THE CHEST.


 








Chest/Abdomen CTA  10/12/17 14:38


IMPRESSION:  NO PULMONARY EMBOLI.  CENTRILOBULAR EMPHYSEMATOUS CHANGES ARE 

PRESENT IN THE UPPER LOBES.


 














Assessment & Plan





- Diagnosis


(1) Acute respiratory failure with hypoxia


Plan: 


The patient will continue oxygen support as needed.  We will try to wean her 

off over the next 24 hours.  She will continue aggressive breathing treatments 

and therapy as outlined below.








(2) COPD exacerbation


Plan: 


The patient will be started on IV Levaquin and IV Solu-Medrol.  She will 

continue aggressive breathing treatments.








(3) Hypertensive urgency


Plan: 


She will have IV hydralazine available as needed.  The patient used to take 

lisinopril that her blood pressure was better.  Will place her back on that 

today as well.








(4) Sinus tachycardia


Plan: 


Likely related to her respiratory distress.  Unfortunately not a candidate for 

beta-blocker.











(6) Tobacco abuse


Plan: 


She declines nicotine patch.  She states that she has cut back smoking a lot 

and plans to quit.











- Time


Time Spent: 50 to 70 Minutes





- Inpatient Certification


Medical Necessity: Need for IV Antibiotics, Other - The patient will be placed 

in observation in the hospital overnight.  I am hoping that she will turn 

around quite quickly as she is already feeling significantly better.  I 

anticipate less than 2 midnights in the hospital.

## 2017-10-12 NOTE — EKG REPORT
SEVERITY:- BORDERLINE ECG -

right atrial enlargement

SINUS TACHYCARDIA

BORDERLINE INFERIOR Q WAVES

MINIMAL ST DEPRESSION, INFERIOR LEADS

:

Confirmed by: Micah Oliva MD 12-Oct-2017 19:46:14

## 2017-10-13 VITALS — SYSTOLIC BLOOD PRESSURE: 159 MMHG | DIASTOLIC BLOOD PRESSURE: 78 MMHG

## 2017-10-13 LAB
ALBUMIN SERPL-MCNC: 4.2 G/DL (ref 3.5–5)
ALP SERPL-CCNC: 142 U/L (ref 38–126)
ALT SERPL-CCNC: 28 U/L (ref 9–52)
ANION GAP SERPL CALC-SCNC: 15 MMOL/L (ref 5–19)
AST SERPL-CCNC: 24 U/L (ref 14–36)
BASOPHILS # BLD AUTO: 0 10^3/UL (ref 0–0.2)
BASOPHILS NFR BLD AUTO: 0.1 % (ref 0–2)
BILIRUB DIRECT SERPL-MCNC: 0.2 MG/DL (ref 0–0.4)
BILIRUB SERPL-MCNC: 0.2 MG/DL (ref 0.2–1.3)
BUN SERPL-MCNC: 7 MG/DL (ref 7–20)
CALCIUM: 9.6 MG/DL (ref 8.4–10.2)
CHLORIDE SERPL-SCNC: 111 MMOL/L (ref 98–107)
CHOLEST SERPL-MCNC: 162.41 MG/DL (ref 0–200)
CO2 SERPL-SCNC: 21 MMOL/L (ref 22–30)
CREAT SERPL-MCNC: 0.64 MG/DL (ref 0.52–1.25)
DIRECT HDL: 48 MG/DL (ref 40–?)
EOSINOPHIL # BLD AUTO: 0 10^3/UL (ref 0–0.6)
EOSINOPHIL NFR BLD AUTO: 0 % (ref 0–6)
ERYTHROCYTE [DISTWIDTH] IN BLOOD BY AUTOMATED COUNT: 14.2 % (ref 11.5–14)
GLUCOSE SERPL-MCNC: 178 MG/DL (ref 75–110)
HCT VFR BLD CALC: 38.2 % (ref 36–47)
HGB BLD-MCNC: 13 G/DL (ref 12–15.5)
HGB HCT DIFFERENCE: 0.8
LDLC SERPL DIRECT ASSAY-MCNC: 91 MG/DL (ref ?–100)
LYMPHOCYTES # BLD AUTO: 0.5 10^3/UL (ref 0.5–4.7)
LYMPHOCYTES NFR BLD AUTO: 7.6 % (ref 13–45)
MAGNESIUM SERPL-MCNC: 2 MG/DL (ref 1.6–2.3)
MCH RBC QN AUTO: 32 PG (ref 27–33.4)
MCHC RBC AUTO-ENTMCNC: 34.2 G/DL (ref 32–36)
MCV RBC AUTO: 94 FL (ref 80–97)
MONOCYTES # BLD AUTO: 0.2 10^3/UL (ref 0.1–1.4)
MONOCYTES NFR BLD AUTO: 2.7 % (ref 3–13)
NEUTROPHILS # BLD AUTO: 6.4 10^3/UL (ref 1.7–8.2)
NEUTS SEG NFR BLD AUTO: 89.6 % (ref 42–78)
PHOSPHATE SERPL-MCNC: 3.8 MG/DL (ref 2.5–4.5)
POTASSIUM SERPL-SCNC: 3.9 MMOL/L (ref 3.6–5)
PROT SERPL-MCNC: 6.9 G/DL (ref 6.3–8.2)
RBC # BLD AUTO: 4.08 10^6/UL (ref 3.72–5.28)
SODIUM SERPL-SCNC: 146.6 MMOL/L (ref 137–145)
TRIGL SERPL-MCNC: 94 MG/DL (ref ?–150)
VLDLC SERPL CALC-MCNC: 19 MG/DL (ref 10–31)
WBC # BLD AUTO: 7.1 10^3/UL (ref 4–10.5)

## 2017-10-13 RX ADMIN — IPRATROPIUM BROMIDE AND ALBUTEROL SULFATE SCH ML: 2.5; .5 SOLUTION RESPIRATORY (INHALATION) at 08:00

## 2017-10-13 RX ADMIN — IPRATROPIUM BROMIDE AND ALBUTEROL SULFATE SCH ML: 2.5; .5 SOLUTION RESPIRATORY (INHALATION) at 02:02

## 2017-10-13 RX ADMIN — METHYLPREDNISOLONE SODIUM SUCCINATE SCH MG: 40 INJECTION, POWDER, FOR SOLUTION INTRAMUSCULAR; INTRAVENOUS at 06:25

## 2017-10-13 NOTE — PDOC DISCHARGE SUMMARY
General





- Admit/Disc Date/PCP


Admission Date/Primary Care Provider: 


  10/12/17 18:27





  


Sun Zuluaga NP


Discharge Date: 10/13/17





- Discharge Diagnosis


(1) Acute respiratory failure with hypoxia


Summary: 


Secondary to COPD exacerbation.  Resolved








(2) COPD exacerbation


Summary: 


She will complete a course of p.o. Levaquin and a prednisone taper as an 

outpatient.








(3) Hypertensive urgency


Summary: 


Likely secondary to respiratory distress.  Improved.  She has been started back 

on her blood pressure medication.  Currently stable on the day of discharge.








(4) Sinus tachycardia


Summary: 


Secondary to respiratory distress.  Improving








(5) Hyperglycemia


Summary: 


Secondary to steroids.  This will need to be followed up as an outpatient.








(6) Tobacco abuse


Summary: 


She has been encouraged to quit smoking.











- Additional Information


Discharge Diet: Cardiac


Discharge Activity: Activity As Tolerated, Balance Activity w/Rest, Slowly 

Increase Activity


Home Medications: 








Albuterol Sulfate [Proair HFA Inhalation Aerosol 8.5 gm MDI] 2 inh PO Q4HP PRN #

1 hfa.aer.ad 10/13/17 


Levofloxacin [Levaquin 750 mg Tablet] 750 mg PO DAILY #10 tab 10/13/17 


Lisinopril [Prinivil 10 mg Tablet] 20 mg PO DAILY #30 tablet 10/13/17 


Prednisone [Deltasone 10 mg Tablet] See Protocol PO ASDIR #39 tablet 10/13/17 











History of Present Illness


History of Present Illness: 


NASIM OTOOLE is a 57 year old female who presented to the emergency room with 

shortness of breath.  She was seen in the emergency room yesterday and 

diagnosed with acute bronchitis and sent out steroids.  Unfortunately her 

shortness of breath worsened and she represented to the emergency room where 

she was found to be hypoxic.  In spite of treatment in the emergency room the 

patient remains hypoxic.  She is having accelerated hypertension and 

tachycardia and she was referred for admission.  








Hospital Course


Hospital Course: 


She was started on IV Solu-Medrol and IV Levaquin.  Over the next 24 hours the 

patient's oxygen was successfully weaned off.  It is felt at this point that 

the patient could be discharged to complete a course of outpatient steroids and 

antibiotics.  She has been encouraged to quit smoking.  She did have evidence 

of hypertensive urgency at the time of admission likely due to her respiratory 

distress.  She was started back on blood pressure medication and her blood 

pressure is stable on the day of discharge.  She did have some hyperglycemia 

however she had been on steroids as well.  This will need to be followed up by 

her primary care provider.  At this point maximum hospital benefits been 

reached.  The patient will be discharged home today in stable condition.








Physical Exam


Vital Signs: 


 











Temp Pulse Resp BP Pulse Ox


 


 97.7 F   116 H  16   144/68 H  92 


 


 10/13/17 08:47  10/13/17 08:47  10/13/17 08:47  10/13/17 08:47  10/13/17 08:47








 Intake & Output











 10/12/17 10/13/17 10/14/17





 06:59 06:59 06:59


 


Intake Total  754 


 


Output Total  1300 


 


Balance  -546 


 


Weight  51 kg 











General appearance: PRESENT: no acute distress, well-developed, well-nourished


Head exam: PRESENT: atraumatic, normocephalic


Eye exam: PRESENT: conjunctiva pink, EOMI, PERRLA.  ABSENT: scleral icterus


Mouth exam: PRESENT: moist, tongue midline


Neck exam: ABSENT: carotid bruit, JVD, lymphadenopathy, thyromegaly


Respiratory exam: PRESENT: decreased breath sounds, prolonged expiratory phas.  

ABSENT: rales, rhonchi


Cardiovascular exam: PRESENT: RRR.  ABSENT: diastolic murmur, rubs, systolic 

murmur


GI/Abdominal exam: PRESENT: normal bowel sounds, soft.  ABSENT: distended, 

guarding, mass, organolmegaly, rebound, tenderness


Neurological exam: PRESENT: alert, awake, oriented to person, oriented to place

, oriented to time, oriented to situation, CN II-XII grossly intact.  ABSENT: 

motor sensory deficit


Psychiatric exam: PRESENT: appropriate affect, normal mood.  ABSENT: homicidal 

ideation, suicidal ideation


Skin exam: PRESENT: dry, intact, warm.  ABSENT: cyanosis, rash





Results


Laboratory Results: 


 





 10/13/17 04:32 





 10/13/17 04:32 





 











  10/13/17 10/13/17 10/13/17





  04:32 04:32 04:32


 


WBC  7.1  


 


RBC  4.08  


 


Hgb  13.0  D  


 


Hct  38.2  


 


MCV  94  


 


MCH  32.0  


 


MCHC  34.2  


 


RDW  14.2 H  


 


Plt Count  273  


 


Seg Neutrophils %  89.6 H  


 


Lymphocytes %  7.6 L  


 


Monocytes %  2.7 L  


 


Eosinophils %  0.0  


 


Basophils %  0.1  


 


Absolute Neutrophils  6.4  


 


Absolute Lymphocytes  0.5  


 


Absolute Monocytes  0.2  


 


Absolute Eosinophils  0.0  


 


Absolute Basophils  0.0  


 


Sodium   146.6 H 


 


Potassium   3.9 


 


Chloride   111 H 


 


Carbon Dioxide   21 L 


 


Anion Gap   15 


 


BUN   7 


 


Creatinine   0.64 


 


Est GFR ( Amer)   > 60 


 


Est GFR (Non-Af Amer)   > 60 


 


Glucose   178 H 


 


Calcium   9.6 


 


Phosphorus   3.8 


 


Magnesium   2.0 


 


Total Bilirubin   0.2 


 


AST   24 


 


ALT   28 


 


Alkaline Phosphatase   142 H 


 


Total Protein   6.9 


 


Albumin   4.2 


 


Triglycerides   94 


 


Cholesterol   162.41 


 


LDL Cholesterol Direct   91 


 


VLDL Cholesterol   19.0 


 


HDL Cholesterol   48 


 


TSH    0.59








 











  10/13/17





  04:32


 


NT-Pro-B Natriuret Pep  224











Impressions: 


 





Chest X-Ray  10/12/17 14:26


IMPRESSION:  NO ACUTE RADIOGRAPHIC FINDING IN THE CHEST.


 








Chest/Abdomen CTA  10/12/17 14:38


IMPRESSION:  NO PULMONARY EMBOLI.  CENTRILOBULAR EMPHYSEMATOUS CHANGES ARE 

PRESENT IN THE UPPER LOBES.


 














Qualifiers


**PATEINT BEING DISCHARGED WITH ANY OF THE FOLLOWING DIAGNOSIS?: No





Plan


Time Spent: Greater than 30 Minutes

## 2019-02-28 ENCOUNTER — HOSPITAL ENCOUNTER (OUTPATIENT)
Dept: HOSPITAL 62 - WI | Age: 59
End: 2019-02-28
Attending: NURSE PRACTITIONER
Payer: COMMERCIAL

## 2019-02-28 DIAGNOSIS — R74.8: Primary | ICD-10-CM

## 2019-02-28 PROCEDURE — 76705 ECHO EXAM OF ABDOMEN: CPT

## 2019-02-28 NOTE — WOMENS IMAGING REPORT
EXAM DESCRIPTION:  U/S ABDOMEN LIMITED



COMPLETED DATE/TIME:  2/28/2019 8:22 am



REASON FOR STUDY:  R74.8 ABNORMAL LEVELS OF OTHER SERUM ENZYMES R74.8  ABNORMAL LEVELS OF OTHER SERUM
 ENZYMES



COMPARISON:  None.



TECHNIQUE:  Dynamic and static grayscale images acquired of the abdomen and recorded on PACS. Additio
nal selected color Doppler and spectral images recorded.



LIMITATIONS:  None.



FINDINGS:  PANCREAS: No masses.  Visualized pancreatic duct normal caliber.

LIVER:  The liver measures 11.9 cm, normal size. No masses. Echotexture normal.

LIVER VASCULATURE: Normal directional flow of the main portal vein and hepatic veins.

GALLBLADDER:  Prior cholecystectomy.

ULTRASOUND-DETECTED ELLSWORTH'S SIGN: Negative.

INTRAHEPATIC DUCTS AND COMMON DUCT: CBD measures 5.0 mm in diameter, normal.  The  intrahepatic ducts
 normal caliber. No filling defects.

INFERIOR VENA CAVA: Normal flow.

AORTA: No aneurysm.

RIGHT KIDNEY:  The right kidney measures 9.4 x 3.0 x 4.8 cm, normal size. Normal echogenicity. No sol
id or suspicious masses. No hydronephrosis. No calcifications.

PERITONEAL AND RIGHT PLEURAL SPACE: No ascites or effusions.

OTHER: No other significant findings.



IMPRESSION:  1.  Prior cholecystectomy.

2. Examination is otherwise unremarkable sonographically.



TECHNICAL DOCUMENTATION:  JOB ID:  [id number]

 2011 [employer]- All Rights Reserved



Reading location - IP/workstation name: [email or web address]

## 2019-12-07 ENCOUNTER — HOSPITAL ENCOUNTER (INPATIENT)
Dept: HOSPITAL 62 - ER | Age: 59
LOS: 2 days | Discharge: HOME | DRG: 482 | End: 2019-12-09
Payer: COMMERCIAL
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
## 2019-12-07 DIAGNOSIS — S72.091A: ICD-10-CM
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
## 2019-12-07 DIAGNOSIS — J40: ICD-10-CM

## 2019-12-07 DIAGNOSIS — S72.141A: Primary | ICD-10-CM

## 2019-12-07 DIAGNOSIS — Z79.899: ICD-10-CM
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
## 2019-12-07 DIAGNOSIS — I10: ICD-10-CM

## 2019-12-07 DIAGNOSIS — Z79.51: ICD-10-CM

## 2019-12-07 DIAGNOSIS — F17.210: ICD-10-CM

## 2019-12-07 LAB
ADD MANUAL DIFF: NO
ALBUMIN SERPL-MCNC: 3.9 G/DL (ref 3.5–5)
ALP SERPL-CCNC: 111 U/L (ref 38–126)
ANION GAP SERPL CALC-SCNC: 10 MMOL/L (ref 5–19)
APPEARANCE UR: CLEAR
APTT BLD: 31.3 SEC (ref 23.5–35.8)
APTT PPP: YELLOW S
AST SERPL-CCNC: 31 U/L (ref 14–36)
BASOPHILS # BLD AUTO: 0.1 10^3/UL (ref 0–0.2)
BASOPHILS NFR BLD AUTO: 1 % (ref 0–2)
BILIRUB DIRECT SERPL-MCNC: 0.2 MG/DL (ref 0–0.4)
BILIRUB SERPL-MCNC: 0.4 MG/DL (ref 0.2–1.3)
BILIRUB UR QL STRIP: NEGATIVE
BUN SERPL-MCNC: 10 MG/DL (ref 7–20)
CALCIUM: 9.7 MG/DL (ref 8.4–10.2)
CHLORIDE SERPL-SCNC: 107 MMOL/L (ref 98–107)
CO2 SERPL-SCNC: 25 MMOL/L (ref 22–30)
EOSINOPHIL # BLD AUTO: 0.2 10^3/UL (ref 0–0.6)
EOSINOPHIL NFR BLD AUTO: 1.3 % (ref 0–6)
ERYTHROCYTE [DISTWIDTH] IN BLOOD BY AUTOMATED COUNT: 13.7 % (ref 11.5–14)
GLUCOSE SERPL-MCNC: 76 MG/DL (ref 75–110)
GLUCOSE UR STRIP-MCNC: NEGATIVE MG/DL
HCT VFR BLD CALC: 40 % (ref 36–47)
HGB BLD-MCNC: 13.8 G/DL (ref 12–15.5)
INR PPP: 1
KETONES UR STRIP-MCNC: NEGATIVE MG/DL
LYMPHOCYTES # BLD AUTO: 1.9 10^3/UL (ref 0.5–4.7)
LYMPHOCYTES NFR BLD AUTO: 13.8 % (ref 13–45)
MCH RBC QN AUTO: 33.5 PG (ref 27–33.4)
MCHC RBC AUTO-ENTMCNC: 34.4 G/DL (ref 32–36)
MCV RBC AUTO: 97 FL (ref 80–97)
MONOCYTES # BLD AUTO: 0.9 10^3/UL (ref 0.1–1.4)
MONOCYTES NFR BLD AUTO: 6.4 % (ref 3–13)
NEUTROPHILS # BLD AUTO: 10.6 10^3/UL (ref 1.7–8.2)
NEUTS SEG NFR BLD AUTO: 77.5 % (ref 42–78)
NITRITE UR QL STRIP: NEGATIVE
PH UR STRIP: 5 [PH] (ref 5–9)
PLATELET # BLD: 322 10^3/UL (ref 150–450)
POTASSIUM SERPL-SCNC: 3.9 MMOL/L (ref 3.6–5)
PROT SERPL-MCNC: 6.7 G/DL (ref 6.3–8.2)
PROT UR STRIP-MCNC: NEGATIVE MG/DL
PROTHROMBIN TIME: 13.2 SEC (ref 11.4–15.4)
RBC # BLD AUTO: 4.12 10^6/UL (ref 3.72–5.28)
SP GR UR STRIP: 1.01
TOTAL CELLS COUNTED % (AUTO): 100 %
UROBILINOGEN UR-MCNC: NEGATIVE MG/DL (ref ?–2)
WBC # BLD AUTO: 13.7 10^3/UL (ref 4–10.5)

## 2019-12-07 PROCEDURE — 81001 URINALYSIS AUTO W/SCOPE: CPT

## 2019-12-07 PROCEDURE — 99285 EMERGENCY DEPT VISIT HI MDM: CPT

## 2019-12-07 PROCEDURE — 85610 PROTHROMBIN TIME: CPT

## 2019-12-07 PROCEDURE — 01230 ANES OPN UPPER 2/3 FEMUR NOS: CPT

## 2019-12-07 PROCEDURE — 85730 THROMBOPLASTIN TIME PARTIAL: CPT

## 2019-12-07 PROCEDURE — 93005 ELECTROCARDIOGRAM TRACING: CPT

## 2019-12-07 PROCEDURE — 96374 THER/PROPH/DIAG INJ IV PUSH: CPT

## 2019-12-07 PROCEDURE — 96375 TX/PRO/DX INJ NEW DRUG ADDON: CPT

## 2019-12-07 PROCEDURE — S0119 ONDANSETRON 4 MG: HCPCS

## 2019-12-07 PROCEDURE — 36415 COLL VENOUS BLD VENIPUNCTURE: CPT

## 2019-12-07 PROCEDURE — 72170 X-RAY EXAM OF PELVIS: CPT

## 2019-12-07 PROCEDURE — 85025 COMPLETE CBC W/AUTO DIFF WBC: CPT

## 2019-12-07 PROCEDURE — 80053 COMPREHEN METABOLIC PANEL: CPT

## 2019-12-07 PROCEDURE — 71045 X-RAY EXAM CHEST 1 VIEW: CPT

## 2019-12-07 PROCEDURE — 0QH604Z INSERTION OF INTERNAL FIXATION DEVICE INTO RIGHT UPPER FEMUR, OPEN APPROACH: ICD-10-PCS

## 2019-12-07 PROCEDURE — 93010 ELECTROCARDIOGRAM REPORT: CPT

## 2019-12-07 RX ADMIN — ACETAMINOPHEN SCH MG: 325 TABLET ORAL at 15:29

## 2019-12-07 RX ADMIN — ONDANSETRON PRN MG: 4 TABLET, ORALLY DISINTEGRATING ORAL at 20:48

## 2019-12-07 RX ADMIN — ACETAMINOPHEN SCH MG: 325 TABLET ORAL at 06:44

## 2019-12-07 RX ADMIN — MORPHINE SULFATE PRN MG: 10 INJECTION INTRAMUSCULAR; INTRAVENOUS; SUBCUTANEOUS at 20:33

## 2019-12-07 RX ADMIN — OXYCODONE AND ACETAMINOPHEN PRN TAB: 5; 325 TABLET ORAL at 18:22

## 2019-12-07 RX ADMIN — CEFAZOLIN SCH MLS/HR: 1 INJECTION, POWDER, FOR SOLUTION INTRAVENOUS at 20:05

## 2019-12-07 RX ADMIN — NICOTINE SCH EACH: 7 PATCH, EXTENDED RELEASE TOPICAL at 16:09

## 2019-12-07 RX ADMIN — OXYCODONE AND ACETAMINOPHEN PRN TAB: 5; 325 TABLET ORAL at 23:45

## 2019-12-07 RX ADMIN — MORPHINE SULFATE PRN MG: 10 INJECTION INTRAMUSCULAR; INTRAVENOUS; SUBCUTANEOUS at 09:49

## 2019-12-07 RX ADMIN — PANTOPRAZOLE SODIUM SCH MG: 20 TABLET, DELAYED RELEASE ORAL at 06:44

## 2019-12-07 RX ADMIN — DOCUSATE SODIUM SCH: 100 CAPSULE, LIQUID FILLED ORAL at 09:59

## 2019-12-07 RX ADMIN — DEXTROSE, SODIUM CHLORIDE, SODIUM LACTATE, POTASSIUM CHLORIDE, AND CALCIUM CHLORIDE PRN MLS/HR: 5; .6; .31; .03; .02 INJECTION, SOLUTION INTRAVENOUS at 06:43

## 2019-12-07 RX ADMIN — ACETAMINOPHEN SCH MG: 325 TABLET ORAL at 22:52

## 2019-12-07 RX ADMIN — CEFAZOLIN SCH MLS/HR: 1 INJECTION, POWDER, FOR SOLUTION INTRAVENOUS at 15:29

## 2019-12-07 RX ADMIN — OXYCODONE AND ACETAMINOPHEN PRN TAB: 5; 325 TABLET ORAL at 06:45

## 2019-12-07 NOTE — PDOC CONSULTATION
Consultation


Consult Date: 19


Attending physician:: LUIS HOSKINS JR


Provider Consulted: LONA PHOENIX





History of Present Illness


Admission Date/PCP: 


  19 05:24





  TASHI CURRY NP





History of Present Illness: 


NASIM OTOOLE is a 59 year old female with a history of hypertension who suffered

a mechanical fall at home and had a hip fracture.  She had no complaints 

otherwise.  She has a history of hypertension.  Hospitalist service was 

consulted for medical recommendations.








Past Medical History


Cardiac Medical History: Reports: Hypertension


Pulmonary Medical History: Reports: Bronchitis


Psychiatric Medical History: 


   Denies: Depression





Past Surgical History


Past Surgical History: Reports: Cholecystectomy, Orthopedic Surgery - back, 

Tubal Ligation





Social History


Lives with: Family


Smoking Status: Current Every Day Smoker


Cigarettes Packs Per Day: 0.5


Electronic Cigarette use?: No


Number of Years Smokin


Frequency of Alcohol Use: Rare


Hx Recreational Drug Use: No


Drugs: None


Hx Prescription Drug Abuse: No





- Advance Directive


Resuscitation Status: Full Code





Family History


Family History: Reviewed & Not Pertinent


Parental Family History Reviewed: Yes


Children Family History Reviewed: Yes


Sibling(s) Family History Reviewed.: Yes





Medication/Allergy


Home Medications: 








Albuterol Sulfate [Ventolin 0.083% Neb 2.5 mg/3 ml Ampul] 1 vial NEB RTQ4HP PRN 

19 


Amlodipine Besylate [Norvasc 5 mg Tablet] 5 mg PO DAILY 19 


Aspirin/Caffeine [Bc Powder Packet] 1 pkt PO TIDP PRN 19 


Ipratropium Bromide [Atrovent Hfa Inhalation Aerosol 12.9 gm Mdi] 2 puff IH QAM 

19 


Ipratropium Bromide [Atrovent Hfa] 1 puff IH BIDP PRN 19 


Lisinopril [Prinivil 10 mg Tablet] 40 mg PO DAILY 19 








Allergies/Adverse Reactions: 


                                        





No Known Allergies Allergy (Verified 10/12/17 14:08)


   











Review of Systems


All systems: reviewed and no additional remarkable complaints except as stated -

All systems were reviewed and negative except as noted in the HPI





Physical Exam


Vital Signs: 


                                        











Temp Pulse Resp BP Pulse Ox


 


 97.4 F   70   12   128/56 H  96 


 


 19 13:40  19 13:40  19 13:40  19 13:40  19 13:40








                                 Intake & Output











 19





 06:59 06:59 06:59


 


Intake Total   1100


 


Output Total   600


 


Balance   500


 


Weight  46.8 kg 











General appearance: PRESENT: no acute distress, cooperative, disheveled


Head exam: PRESENT: atraumatic, normocephalic


Eye exam: PRESENT: EOMI, PERRLA.  ABSENT: conjunctival injection, nystagmus, 

scleral icterus


Ear exam: PRESENT: normal external ear exam


Mouth exam: PRESENT: moist, neck supple


Throat exam: ABSENT: post pharyngeal erythema


Neck exam: PRESENT: full ROM.  ABSENT: carotid bruit, JVD, lymphadenopathy, 

meningismus, tenderness, thyromegaly


Respiratory exam: PRESENT: clear to auscultation luna, symmetrical, unlabored.  

ABSENT: accessory muscle use, chest wall tenderness, crackles, prolonged 

expiratory phas, rhonchi, tachypnea, wheezes


Cardiovascular exam: PRESENT: RRR, +S1, +S2


Pulses: PRESENT: normal carotid pulses


Vascular exam: PRESENT: normal capillary refill


GI/Abdominal exam: PRESENT: normal bowel sounds, soft.  ABSENT: distended, 

guarding, rebound, tenderness


Extremities exam: ABSENT: clubbing, pedal edema


Neurological exam: PRESENT: alert, awake, oriented to person, oriented to place,

oriented to time, oriented to situation, CN II-XII grossly intact.  ABSENT: 

motor sensory deficit


Psychiatric exam: PRESENT: appropriate affect, normal mood


Skin exam: PRESENT: dry, warm





Results


Laboratory Results: 


                                        





                                 19 03:57 





                                 19 03:57 





                                        











  19





  03:57 03:57 04:32


 


WBC  13.7 H  


 


RBC  4.12  


 


Hgb  13.8  


 


Hct  40.0  


 


MCV  97  


 


MCH  33.5 H  


 


MCHC  34.4  


 


RDW  13.7  


 


Plt Count  322  


 


Seg Neutrophils %  77.5  


 


Sodium   141.8 


 


Potassium   3.9 


 


Chloride   107 


 


Carbon Dioxide   25 


 


Anion Gap   10 


 


BUN   10 


 


Creatinine   0.89 


 


Est GFR ( Amer)   > 60 


 


Glucose   76 


 


Calcium   9.7 


 


Total Bilirubin   0.4 


 


AST   31 


 


Alkaline Phosphatase   111 


 


Total Protein   6.7 


 


Albumin   3.9 


 


Urine Color    YELLOW


 


Urine Appearance    CLEAR


 


Urine pH    5.0


 


Ur Specific Gravity    1.012


 


Urine Protein    NEGATIVE


 


Urine Glucose (UA)    NEGATIVE


 


Urine Ketones    NEGATIVE


 


Urine Blood    NEGATIVE


 


Urine Nitrite    NEGATIVE


 


Ur Leukocyte Esterase    NEGATIVE


 


Urine WBC (Auto)    2


 


Urine RBC (Auto)    1











Impressions: 


                                        





Fluoroscopy  19 00:00


IMPRESSION:  IMAGE(S) OBTAINED DURING PROCEDURE.


 








Hip/Pelvis X-Ray  19 02:07


IMPRESSION:


 


Nondisplaced right intertrochanteric fracture


 


 


copyright  [employer]- All Rights Reserved


 








Chest X-Ray  19 03:39


IMPRESSION:


 


No acute cardiopulmonary abnormality


 


 


copyright  [employer]- All Rights Reserved


 














Assessment and Plan





- Diagnosis


(1) Closed intertrochanteric fracture of right hip


Qualifiers: 


   Encounter type: initial encounter   Fracture alignment: nondisplaced   

Qualified Code(s): S72.144A - Nondisplaced intertrochanteric fracture of right 

femur, initial encounter for closed fracture   


Is this a current diagnosis for this admission?: Yes   


Plan: 


Management per orthopedics








(2) Hypertension


Qualifiers: 


   Hypertension type: essential hypertension   Qualified Code(s): I10 - 

Essential (primary) hypertension   


Is this a current diagnosis for this admission?: Yes   


Plan: 


She is on 2 blood pressure medications at home.  These can be resumed 

postoperatively when she is allowed to take p.o.








- Plan Summary


Summary: 


Hospitalist will sign off.  Please reconsult if needed.





- Time


Time Spent with patient: 35 or more minutes

## 2019-12-07 NOTE — OPERATIVE REPORT
Operative Report


DATE OF SURGERY: 12/07/19


PREOPERATIVE DIAGNOSIS: Right hip intertrochanteric fracture, minimally displac

ed.


OPERATION: Right hip sliding hip screw with plate


SURGEON: LUIS HOSKINS JR


ANESTHESIA: Spinal


COMPLICATIONS: 





None


ESTIMATED BLOOD LOSS: 100 cc


PROCEDURE: 





DESCRIPTION OF OPERATION: The patient was brought to the operating room where 

spinal anesthetic was administered. Once the patient was comfortable, they was 

transferred over to the fracture table. A Laura catheter was present at the time

prior to operation. IV antibiotics were administered preoperatively, 1 g of 

Ancef. The patient was secured onto the fracture table with a boot on the 

operative leg in a well leg smith. A perineal post had been placed. The left 

lower extremity was secured down and longitudinal traction applied through the 

post.  C-arm fluoroscopy was then brought in to check fracture alignment, and 

with adjustments we obtained near anatomic alignment on AP and lateral views.  

In her case she was minimally displaced and adjustments were not needed.





The lateral hip region was then prepped and draped out in the usual sterile 

fashion.  An appropriate timeout was performed.  A wire was utilized with 

fluoroscopy to localize the trajectory of the hip pen.  Based off of this a line

was drawn to represent the neck angle and are lateral incision was planned.  The

lateral incision was made followed by Bovie cautery of superficial bleeding.  

The lateral fascia was approached and dissected with cautery.  After this blunt 

dissection was carried through the deep muscle to bone.  The angle guide was 

placed onto bone and a pin was placed through this and checked with fluoroscopy.

 The femur was then drilled until we had the appropriate trajectory of the pin 

at the center of the head on 2 views.  We measured this out to be 90 mm.  We 

then reamed to 10 less with a combination reamer and a long barrel.  Due to the 

estimated angle we went with a 130 degree plate.  A 2 hole plate was selected 

along with appropriate sized head screw which was 75 mm, although I preferred 80

mm which was not available, the 75 was adequate given the length of the barrel. 

This was inserted with the assistance of fluoroscopy followed by impaction of 

the plate against the bone.  We then drilled and placed the 2 screws in the 

plate.  Appropriate positioning was confirmed with AP and lateral fluoroscopy.  

A compression screw was applied to the head screw and then final fluoroscopy's 

were taken.  





All the wounds were then copiously irrigated.. The fascial layer was closed with

2-0 Vicryl in a running fashion. Subcutaneous tissue was closed with inverted 3-

0 Monocryl, and the skin was closed with a running 3-0 Monocryl stitch.  

Dermabond was then applied followed by an OpSite dressing.  The patient was then

carefully transferred off the fracture table onto his hospital bed. The patient 

tolerated the procedure well and was brought to the recovery room in stable 

condition.

## 2019-12-07 NOTE — RADIOLOGY REPORT (SQ)
EXAM DESCRIPTION: 



XR HIP 2 OR MORE VIEWS



COMPLETED DATE/TME:  12/07/2019 02:07



CLINICAL HISTORY: 



59 years, Female, fall, bone tenderness, pain with ROM



COMPARISON:

None.



NUMBER OF VIEWS:

Two



TECHNIQUE:

Two views of the right hip



LIMITATIONS:

None.



FINDINGS:



There is a nondisplaced right intertrochanteric fracture. No

other fracture is identified. No large soft tissue swelling. No

radiopaque foreign body.



IMPRESSION:



Nondisplaced right intertrochanteric fracture

 



copyright 2011 [employer]- All Rights Reserved

## 2019-12-07 NOTE — ER DOCUMENT REPORT
ED Fall





- General


Chief Complaint: Hip Pain


Stated Complaint: FALL,RIGHT HIP GARRIDO


Time Seen by Provider: 12/07/19 03:32


Notes: 


Patient is a 59-year-old female that comes to the emergency department for chief

complaint of tripping over her dog and falling to the ground.  She states that 

she fell on mainly landed on her right hip and also slightly on her right side. 

She states she did hit her head on the ground softly, she denies headache, pas

sing out, vomiting.  She had a witnessed fall by her son.  Son is at bedside.  

She denies alcohol tonight.  Past medical history of borderline COPD reportedly,

currently smokes, hypertension, and facial surgery.  Denies medical history 

otherwise.





TRAVEL OUTSIDE OF THE [place] IN LAST 30 DAYS: No





- Related data


Allergies/Adverse Reactions: 


                                        





No Known Allergies Allergy (Verified 10/12/17 14:08)


   











Past Medical History





- General


Information source: Patient





- Social History


Smoking Status: Current Every Day Smoker


Frequency of alcohol use: None


Drug Abuse: None


Lives with: Family


Family History: Reviewed & Not Pertinent


Patient has suicidal ideation: No


Patient has homicidal ideation: No





- Past Medical History


Cardiac Medical History: Reports: Hx Hypertension


Pulmonary Medical History: Reports: Hx Bronchitis


Renal/ Medical History: Denies: Hx Peritoneal Dialysis


Psychiatric Medical History: 


   Denies: Hx Depression


Past Surgical History: Reports: Hx Cholecystectomy, Hx Oral Surgery - jaw, Hx 

Orthopedic Surgery - back, Hx Tubal Ligation





- Immunizations


Hx Diphtheria, Pertussis, Tetanus Vaccination: Yes





Review of Systems





- Review of Systems


Constitutional: No symptoms reported


EENT: No symptoms reported


Cardiovascular: No symptoms reported


Respiratory: No symptoms reported


Gastrointestinal: No symptoms reported


Genitourinary: No symptoms reported


Female Genitourinary: No symptoms reported


Musculoskeletal: See HPI


Skin: No symptoms reported


Hematologic/Lymphatic: No symptoms reported


Neurological/Psychological: No symptoms reported





Physical Exam





- Vital signs


Vitals: 


                                        











Temp Pulse Resp BP Pulse Ox


 


 97.6 F   72   20   130/72 H  100 


 


 12/07/19 02:09  12/07/19 02:09  12/07/19 02:09  12/07/19 02:09  12/07/19 02:09














- Notes


Notes: 





GENERAL: Alert, interacts well. No acute distress.  No distress unless she moves

her right leg and then she has obvious pain.


HEAD: Normocephalic, atraumatic.


EYES: Pupils equal, round, and reactive to light. Extraocular movements intact.


ENT: Oral mucosa moist, tongue midline. Oropharynx unremarkable. Airway patent.


NECK: Full range of motion. Supple. Trachea midline.


LUNGS: Clear to auscultation bilaterally, no wheezes, rales, or rhonchi. No 

respiratory distress.


HEART: Regular rate and rhythm. No murmur


ABDOMEN: Soft, non-tender. Non-distended. 


EXTREMITIES: Very painful over the right groin generally, unable to move the 

area.  Normal distal neurovascular exam, no hematoma or signs of trauma.  

Unremarkable otherwise.


BACK: no cervical, thoracic, lumbar midline tenderness. No saddle anesthesia, 

normal distal neurovascular exam. Moves all extremities in full range of motion.


NEUROLOGICAL: Alert and oriented x3. Normal speech. Cranial nerves II through 

XII grossly intact. 


PSYCH: Normal affect, normal mood.


SKIN: Warm, dry, normal turgor. No rashes or lesions noted.





Course





- Re-evaluation


Re-evalutation: 


Patient with obvious tenderness of the right hip, she cannot move this without 

severe pain in the right groin area.  Normal distal neurovascular exam.  No 

other signs of trauma noted.  No other complaints.  Vital signs unremarkable.





Patient much more comfortable after pain medication.  X-ray does confirm right 

hip fracture in the intertrochanteric region.  She will require admission.  

Preop labs and work-up was performed, chest x-ray, EKG, labs generally 

unremarkable.  Patient much more comfortable on reevaluation.





Discussed with Dr. Tabares.  Called and spoke with Dr. Jo, orthopedic surgeon. 

He has accepted patient to his service and plans to perform surgery in the 

morning, asks for her to be excepted to the surgical floor.  Patient states 

appreciation and agreement.





- Vital Signs


Vital signs: 


                                        











Temp Pulse Resp BP Pulse Ox


 


 97.3 F   76   18   126/63 H  97 


 


 12/07/19 06:14  12/07/19 06:14  12/07/19 06:14  12/07/19 06:14  12/07/19 06:14














- Laboratory


Result Diagrams: 


                                 12/07/19 03:57





                                 12/07/19 03:57


Laboratory results interpreted by me: 


                                        











  12/07/19





  03:57


 


WBC  13.7 H


 


MCH  33.5 H


 


Absolute Neuts (auto)  10.6 H














Discharge





- Discharge


Clinical Impression: 


Closed right hip fracture


Qualifiers:


 Encounter type: initial encounter Qualified Code(s): S72.001A - Fracture of 

unspecified part of neck of right femur, initial encounter for closed fracture





Fall


Qualifiers:


 Encounter type: initial encounter Qualified Code(s): W19.XXXA - Unspecified 

fall, initial encounter





Condition: Stable


Disposition: ADMITTED AS INPATIENT


Admitting Provider: Surgicalist - Orthopedics: Dr. Jo


Unit Admitted: Surgical Floor

## 2019-12-07 NOTE — RADIOLOGY REPORT (SQ)
EXAM DESCRIPTION:  NO CHG FLUORO; HIP RIGHT AP/LATERAL



COMPLETED DATE/TIME:  12/7/2019 1:48 pm



REASON FOR STUDY:  RT HIP FX



COMPARISON:  None.



FLUOROSCOPY TIME:  0.5 minutes

2 images saved to PACS.



TECHNIQUE:  Intra-operative images acquired during surgical procedure to evaluate progress.

NUMBER OF IMAGES: 2



LIMITATIONS:  None.



FINDINGS:  Dynamic hip compression screw fixation of intertrochanteric hip fracture.



IMPRESSION:  IMAGE(S) OBTAINED DURING PROCEDURE.



COMMENT:  Quality :  Final reports for procedures using fluoroscopy that document radiation exp
osure indices, or exposure time and number of fluorographic images (if radiation exposure indices are
 not available)

Please consult full operative report of the attending physician for description of the procedure.



TECHNICAL DOCUMENTATION:  JOB ID:  [id number]

 2011 [employer]- All Rights Reserved



Reading location - IP/workstation name: [email or web address]

## 2019-12-07 NOTE — RADIOLOGY REPORT (SQ)
EXAM DESCRIPTION:  NO CHG FLUORO; HIP RIGHT AP/LATERAL



COMPLETED DATE/TIME:  12/7/2019 1:48 pm



REASON FOR STUDY:  RT HIP FX



COMPARISON:  None.



FLUOROSCOPY TIME:  0.5 minutes

2 images saved to PACS.



TECHNIQUE:  Intra-operative images acquired during surgical procedure to evaluate progress.

NUMBER OF IMAGES: 2



LIMITATIONS:  None.



FINDINGS:  Dynamic hip compression screw fixation of intertrochanteric hip fracture.



IMPRESSION:  IMAGE(S) OBTAINED DURING PROCEDURE.



COMMENT:  Quality :  Final reports for procedures using fluoroscopy that document radiation exp
osure indices, or exposure time and number of fluorographic images (if radiation exposure indices are
 not available)

Please consult full operative report of the attending physician for description of the procedure.



TECHNICAL DOCUMENTATION:  JOB ID:  [id number]

 2011 [employer]- All Rights Reserved



Reading location - IP/workstation name: [email or web address]

## 2019-12-07 NOTE — RADIOLOGY REPORT (SQ)
EXAM DESCRIPTION: 



XR CHEST 1 VIEW



COMPLETED DATE/TME:  12/07/2019 03:39



CLINICAL HISTORY: 



59 years, Female, pre-op



COMPARISON:

5/4/2017



NUMBER OF VIEWS:

One



TECHNIQUE:

AP view of the chest



LIMITATIONS:

None.



FINDINGS:



The lungs are clear. The heart is normal in size. There is no

pneumothorax or pleural effusion. There is no acute fracture.



IMPRESSION:



No acute cardiopulmonary abnormality

 



copyright 2011 [employer]- All Rights Reserved

## 2019-12-07 NOTE — RADIOLOGY REPORT (SQ)
EXAM DESCRIPTION:  PELVIS AP



COMPLETED DATE/TIME:  12/7/2019 4:52 pm



REASON FOR STUDY:  PACU



COMPARISON:  None.



NUMBER OF VIEWS:  One view(s).



TECHNIQUE:  Digital radiographic images of the right hip post-procedure.



LIMITATIONS:  None.



FINDINGS:  BONES: No  worrisome or unexpected findings post-procedure.

DEVICE: Lateral femoral compression screw.

SOFT TISSUES:  No worrisome findings.  Expected postoperative soft tissue changes.



IMPRESSION:  SATISFACTORY POSTOPERATIVE RIGHT HIP.



TECHNICAL DOCUMENTATION:  JOB ID:  [id number]

[id number]

 2011 [employer]- All Rights Reserved



Reading location - IP/workstation name: [employer]

## 2019-12-07 NOTE — PDOC H&P
History of Present Illness


Admission Date/PCP: 


  19 05:24





  TASHI CURRY NP





Patient complains of: Right hip pain


History of Present Illness: 


NASIM OTOOLE is a 59 year old female








Past Medical History


Cardiac Medical History: Reports: Hypertension


Pulmonary Medical History: Reports: Bronchitis


Psychiatric Medical History: 


   Denies: Depression





Past Surgical History


Past Surgical History: Reports: Cholecystectomy, Orthopedic Surgery - back, 

Tubal Ligation





Social History


Information Source: Patient


Lives with: Family


Smoking Status: Current Every Day Smoker


Cigarettes Packs Per Day: 0.5


Electronic Cigarette use?: No


Number of Years Smokin


Frequency of Alcohol Use: Rare


Hx Recreational Drug Use: No


Drugs: None


Hx Prescription Drug Abuse: No





- Advance Directive


Resuscitation Status: Full Code





Family History


Family History: Reviewed & Not Pertinent


Parental Family History Reviewed: No


Children Family History Reviewed: NA


Sibling(s) Family History Reviewed.: NA





Medication/Allergy


Home Medications: 








Albuterol Sulfate [Ventolin 0.083% Neb 2.5 mg/3 ml Ampul] 1 vial NEB RTQ4HP PRN 

19 


Amlodipine Besylate [Norvasc 5 mg Tablet] 5 mg PO DAILY 19 


Aspirin/Caffeine [Bc Powder Packet] 1 pkt PO TIDP PRN 19 


Ipratropium Bromide [Atrovent Hfa Inhalation Aerosol 12.9 gm Mdi] 2 puff IH QAM 

19 


Ipratropium Bromide [Atrovent Hfa] 1 puff IH BIDP PRN 19 


Lisinopril [Prinivil 10 mg Tablet] 40 mg PO DAILY 19 








Allergies/Adverse Reactions: 


                                        





No Known Allergies Allergy (Verified 10/12/17 14:08)


   











Review of Systems


All systems: as per PMH


Review of Systems: 





Constitutional: ABSENT: anorexia, chills, night sweats


Cardiovascular: ABSENT: chest pain


Respiratory: ABSENT: dyspnea


Gastrointestinal: ABSENT: vomiting


Genitourinary: ABSENT: dysuria


Integumentary: ABSENT: rash


Neurological: ABSENT: confusion, memory loss, numbness


Psychiatric: ABSENT: hallucinations


Hematologic/Lymphatic: ABSENT: easy bleeding





All negative as above aside from that reported in the HPI





Physical Exam


Vital Signs: 


                                        











Temp Pulse Resp BP Pulse Ox


 


 98.3 F   75   17   100/57 L  97 


 


 19 07:16  19 07:16  19 07:16  19 07:16  19 07:16








                                 Intake & Output











 19





 06:59 06:59 06:59


 


Weight  46.8 kg 











Physical Exam: 





General appearance: PRESENT: no acute distress, cooperative, well-nourished


Head exam: PRESENT: atraumatic, normocephalic


Eye exam: PRESENT: EOMI


Ear exam: PRESENT: normal external ear exam


Mouth exam: PRESENT: neck supple


Neck exam: ABSENT: tracheal deviation


Respiratory exam: PRESENT: symmetrical, unlabored.  ABSENT: accessory muscle 

use, wheezes


Pulses: PRESENT: normal radial pulses, normal dorsalis pedis pulse


Vascular exam: PRESENT: normal capillary refill


GI/Abdominal exam: ABSENT: distended, firm


Extremities exam: PRESENT: full ROM of bilateral shoulders, elbows wrists, kne

es, hips and ankles without pain


Musculoskeletal exam: PRESENT: full ROM, normal inspection of all 4 extremities 

aside from that noted below. 


Neurological exam: PRESENT: alert, awake, oriented to person, oriented to place,

oriented to time


Psychiatric exam: PRESENT: appropriate affect.  ABSENT: agitated


Focused psych exam: ABSENT: catatonic


Skin exam: PRESENT: intact.  ABSENT: dry





All as above aside from that noted in the HPI and the following:


-Pulses 2+ distally


-Compartments soft


-Sensation grossly intact to L3-4-5 S1


-Motor grossly intact to EHL TA gastroc and quad


Pain with right hip logroll and axial compression.





Results


Laboratory Results: 


                                        





                                 19 03:57 





                                 19 03:57 





                                        











  19





  03:57 03:57 04:32


 


WBC  13.7 H  


 


RBC  4.12  


 


Hgb  13.8  


 


Hct  40.0  


 


MCV  97  


 


MCH  33.5 H  


 


MCHC  34.4  


 


RDW  13.7  


 


Plt Count  322  


 


Seg Neutrophils %  77.5  


 


Sodium   141.8 


 


Potassium   3.9 


 


Chloride   107 


 


Carbon Dioxide   25 


 


Anion Gap   10 


 


BUN   10 


 


Creatinine   0.89 


 


Est GFR ( Amer)   > 60 


 


Glucose   76 


 


Calcium   9.7 


 


Total Bilirubin   0.4 


 


AST   31 


 


Alkaline Phosphatase   111 


 


Total Protein   6.7 


 


Albumin   3.9 


 


Urine Color    YELLOW


 


Urine Appearance    CLEAR


 


Urine pH    5.0


 


Ur Specific Gravity    1.012


 


Urine Protein    NEGATIVE


 


Urine Glucose (UA)    NEGATIVE


 


Urine Ketones    NEGATIVE


 


Urine Blood    NEGATIVE


 


Urine Nitrite    NEGATIVE


 


Ur Leukocyte Esterase    NEGATIVE


 


Urine WBC (Auto)    2


 


Urine RBC (Auto)    1











Impressions: 


                                        





Hip/Pelvis X-Ray  19 02:07


IMPRESSION:


 


Nondisplaced right intertrochanteric fracture


 


 


copyright  [employer]- All Rights Reserved


 








Chest X-Ray  19 03:39


IMPRESSION:


 


No acute cardiopulmonary abnormality


 


 


copyright  [employer]- All Rights Reserved


 














Assessment & Plan





- Diagnosis


(1) Closed intertrochanteric fracture of right hip


Plan: 


-  2 doses of Ancef postoperatively q 8 hours to complete 24 hours 

perioperatively 


-Weightbearing as tolerated, no precautions, encourage out of bed ASAP for ADL 

training


- PT/OT


-aspirin 325 daily for DVT prophylaxis for 6 weeks


-multimodal pain management to avoid excessive narcotics, including gabapentin, 

tramadol, Toradol, acetaminophen.


-Dressing should not be removed for 7 to 10 days until seen in the office


-May shower with the dressing intact, if it starts to come off she should not 

get the incision wet.


-I would like to follow the patient my office within the next 7 to 10 days at 

[address] [place] [address] in [place] office #: [phone]








(2) Closed right hip fracture


Qualifiers: 


   Encounter type: initial encounter   Qualified Code(s): S72.001A - Fracture of

unspecified part of neck of right femur, initial encounter for closed fracture  




Is this a current diagnosis for this admission?: Yes   


Plan: 


-  2 doses of Ancef postoperatively q 8 hours to complete 24 hours 

perioperatively 


-Weightbearing as tolerated, no precautions, encourage out of bed ASAP for ADL 

training


- PT/OT


- Keep knee extended in bed, rolled towel under the ankle to obtain full 

extension


-aspirin 325 daily for DVT prophylaxis for 6 weeks


-multimodal pain management to avoid excessive narcotics, including gabapentin, 

tramadol, Toradol, acetaminophen.


-Dressing should not be removed for 7 to 10 days until seen in the office


-May shower with the dressing intact, if it starts to come off she should not 

get the incision wet.


-I would like to follow the patient my office within the next 7 to 10 days at 

[address] [place] [address] in [place] office #: [phone]








(3) Bronchitis


Is this a current diagnosis for this admission?: Yes   





(4) Hypertension


Qualifiers: 


   Hypertension type: essential hypertension   Qualified Code(s): I10 - 

Essential (primary) hypertension   


Is this a current diagnosis for this admission?: Yes   





(5) Tobacco abuse


Is this a current diagnosis for this admission?: Yes

## 2019-12-08 RX ADMIN — ACETAMINOPHEN SCH MG: 325 TABLET ORAL at 05:33

## 2019-12-08 RX ADMIN — CEFAZOLIN SCH MLS/HR: 1 INJECTION, POWDER, FOR SOLUTION INTRAVENOUS at 21:37

## 2019-12-08 RX ADMIN — DOCUSATE SODIUM SCH: 100 CAPSULE, LIQUID FILLED ORAL at 09:09

## 2019-12-08 RX ADMIN — ACETAMINOPHEN SCH MG: 325 TABLET ORAL at 21:37

## 2019-12-08 RX ADMIN — CEFAZOLIN SCH MLS/HR: 1 INJECTION, POWDER, FOR SOLUTION INTRAVENOUS at 14:02

## 2019-12-08 RX ADMIN — OXYCODONE AND ACETAMINOPHEN PRN TAB: 5; 325 TABLET ORAL at 08:58

## 2019-12-08 RX ADMIN — DEXTROSE, SODIUM CHLORIDE, SODIUM LACTATE, POTASSIUM CHLORIDE, AND CALCIUM CHLORIDE PRN MLS/HR: 5; .6; .31; .03; .02 INJECTION, SOLUTION INTRAVENOUS at 00:11

## 2019-12-08 RX ADMIN — PANTOPRAZOLE SODIUM SCH MG: 20 TABLET, DELAYED RELEASE ORAL at 05:34

## 2019-12-08 RX ADMIN — MORPHINE SULFATE PRN MG: 10 INJECTION INTRAMUSCULAR; INTRAVENOUS; SUBCUTANEOUS at 03:16

## 2019-12-08 RX ADMIN — IPRATROPIUM BROMIDE SCH INHALER: 17 AEROSOL, METERED RESPIRATORY (INHALATION) at 08:58

## 2019-12-08 RX ADMIN — DEXTROSE, SODIUM CHLORIDE, SODIUM LACTATE, POTASSIUM CHLORIDE, AND CALCIUM CHLORIDE PRN MLS/HR: 5; .6; .31; .03; .02 INJECTION, SOLUTION INTRAVENOUS at 12:32

## 2019-12-08 RX ADMIN — CEFAZOLIN SCH MLS/HR: 1 INJECTION, POWDER, FOR SOLUTION INTRAVENOUS at 03:12

## 2019-12-08 RX ADMIN — ACETAMINOPHEN SCH MG: 325 TABLET ORAL at 14:02

## 2019-12-08 RX ADMIN — OXYCODONE HYDROCHLORIDE PRN MG: 5 TABLET ORAL at 16:55

## 2019-12-08 RX ADMIN — CEFAZOLIN SCH MLS/HR: 1 INJECTION, POWDER, FOR SOLUTION INTRAVENOUS at 08:57

## 2019-12-08 RX ADMIN — ASPIRIN 325 MG ORAL TABLET SCH MG: 325 PILL ORAL at 09:09

## 2019-12-08 RX ADMIN — ONDANSETRON PRN MG: 4 TABLET, ORALLY DISINTEGRATING ORAL at 10:43

## 2019-12-08 RX ADMIN — NICOTINE SCH EACH: 7 PATCH, EXTENDED RELEASE TOPICAL at 09:09

## 2019-12-08 RX ADMIN — AMLODIPINE BESYLATE SCH MG: 5 TABLET ORAL at 09:09

## 2019-12-08 RX ADMIN — LISINOPRIL SCH MG: 10 TABLET ORAL at 09:10

## 2019-12-08 NOTE — PDOC PROGRESS REPORT
Subjective


Progress Note for:: 12/08/19


Subjective:: 





Patient is doing well today.  She did walk with physical therapy but found it 

difficult and was only able to take a few steps.  She is not quite ready for 

discharge home at this time.  She has no new symptoms but does report some 

soreness that is treated well with her current pain management


Reason For Visit: 


RIGHT INTERTROCHANTERIC HIP FRACTURE








Physical Exam


Vital Signs: 


                                        











Temp Pulse Resp BP Pulse Ox


 


 98.6 F   97   16   129/71 H  91 L


 


 12/08/19 08:00  12/08/19 08:00  12/08/19 08:00  12/08/19 08:00  12/08/19 08:00








                                 Intake & Output











 12/07/19 12/08/19 12/09/19





 06:59 06:59 06:59


 


Intake Total  3887 50


 


Output Total  2460 


 


Balance  1427 50


 


Weight 46.8 kg 50.1 kg 











Physical Exam: 





General appearance: PRESENT: no acute distress, cooperative, well-nourished


Head exam: PRESENT: atraumatic, normocephalic


Eye exam: PRESENT: EOMI


Ear exam: PRESENT: normal external ear exam


Mouth exam: PRESENT: neck supple


Neck exam: ABSENT: tracheal deviation


Respiratory exam: PRESENT: symmetrical, unlabored.  ABSENT: accessory muscle use

, wheezes


Pulses: PRESENT: normal radial pulses, normal dorsalis pedis pulse


Vascular exam: PRESENT: normal capillary refill


GI/Abdominal exam: ABSENT: distended, firm


Extremities exam: PRESENT: full ROM of bilateral shoulders, elbows wrists, 

knees, hips and ankles without pain


Musculoskeletal exam: PRESENT: full ROM, normal inspection of all 4 extremities 

aside from that noted below. 


Neurological exam: PRESENT: alert, awake, oriented to person, oriented to place,

oriented to time


Psychiatric exam: PRESENT: appropriate affect.  ABSENT: agitated


Focused psych exam: ABSENT: catatonic


Skin exam: PRESENT: intact.  ABSENT: dry





All as above aside from that noted in the HPI and the following:





-Pulses 2+ distally


-Compartments soft


-Wound clean dry and intact no drainage in appropriate swelling for postop day


-Sensation grossly intact to L3-4-5 S1


-Motor grossly intact to EHL TA gastroc and quad


- Able to perform quad extension and elevate heel off of bed. 





Results


Laboratory Results: 


                                        





                                 12/07/19 03:57 





                                 12/07/19 03:57 








Impressions: 


                                        





Fluoroscopy  12/07/19 00:00


IMPRESSION:  IMAGE(S) OBTAINED DURING PROCEDURE.


 








Pelvis X-Ray  12/07/19 00:00


IMPRESSION:  SATISFACTORY POSTOPERATIVE RIGHT HIP.


 








Hip/Pelvis X-Ray  12/07/19 02:07


IMPRESSION:


 


Nondisplaced right intertrochanteric fracture


 


 


copyright 2011 [employer]- All Rights Reserved


 








Chest X-Ray  12/07/19 03:39


IMPRESSION:


 


No acute cardiopulmonary abnormality


 


 


copyright 2011 [employer]- All Rights Reserved


 














Assessment & Plan





- Diagnosis


(1) Closed intertrochanteric fracture of right hip


Qualifiers: 


   Encounter type: initial encounter   Fracture alignment: nondisplaced   

Qualified Code(s): S72.144A - Nondisplaced intertrochanteric fracture of right 

femur, initial encounter for closed fracture   


Is this a current diagnosis for this admission?: Yes   


Plan: 


Aspirin daily for DVT prophylaxis


Physical therapy and Occupational Therapy to help her get out of bed daily


-Weightbearing as tolerated


-Continue current pain management


-Change dressings as needed


-She is to follow-up in my clinic in 10 to 14 days for further x-rays and wound 

evaluation, she is to call my office for an appointment


-Likely discharge home tomorrow








(2) Closed right hip fracture


Qualifiers: 


   Encounter type: initial encounter   Qualified Code(s): S72.001A - Fracture of

unspecified part of neck of right femur, initial encounter for closed fracture  




Is this a current diagnosis for this admission?: Yes   





(3) Bronchitis


Is this a current diagnosis for this admission?: Yes   





(4) Hypertension


Qualifiers: 


   Hypertension type: essential hypertension   Qualified Code(s): I10 - 

Essential (primary) hypertension   


Is this a current diagnosis for this admission?: Yes   





(5) Tobacco abuse


Is this a current diagnosis for this admission?: Yes   





- Time


Time Spent with patient: Less than 15 minutes

## 2019-12-09 VITALS — DIASTOLIC BLOOD PRESSURE: 60 MMHG | SYSTOLIC BLOOD PRESSURE: 115 MMHG

## 2019-12-09 RX ADMIN — IPRATROPIUM BROMIDE SCH: 17 AEROSOL, METERED RESPIRATORY (INHALATION) at 13:14

## 2019-12-09 RX ADMIN — ACETAMINOPHEN SCH MG: 325 TABLET ORAL at 05:45

## 2019-12-09 RX ADMIN — CEFAZOLIN SCH MLS/HR: 1 INJECTION, POWDER, FOR SOLUTION INTRAVENOUS at 10:23

## 2019-12-09 RX ADMIN — DOCUSATE SODIUM SCH MG: 100 CAPSULE, LIQUID FILLED ORAL at 10:15

## 2019-12-09 RX ADMIN — LISINOPRIL SCH MG: 10 TABLET ORAL at 10:16

## 2019-12-09 RX ADMIN — CEFAZOLIN SCH MLS/HR: 1 INJECTION, POWDER, FOR SOLUTION INTRAVENOUS at 03:08

## 2019-12-09 RX ADMIN — PANTOPRAZOLE SODIUM SCH MG: 20 TABLET, DELAYED RELEASE ORAL at 05:45

## 2019-12-09 RX ADMIN — OXYCODONE HYDROCHLORIDE PRN MG: 5 TABLET ORAL at 01:03

## 2019-12-09 RX ADMIN — ONDANSETRON PRN MG: 4 TABLET, ORALLY DISINTEGRATING ORAL at 08:15

## 2019-12-09 RX ADMIN — ACETAMINOPHEN SCH MG: 325 TABLET ORAL at 14:27

## 2019-12-09 RX ADMIN — ASPIRIN 325 MG ORAL TABLET SCH MG: 325 PILL ORAL at 10:16

## 2019-12-09 RX ADMIN — OXYCODONE HYDROCHLORIDE PRN MG: 5 TABLET ORAL at 08:20

## 2019-12-09 RX ADMIN — AMLODIPINE BESYLATE SCH MG: 5 TABLET ORAL at 10:15

## 2019-12-09 RX ADMIN — NICOTINE SCH EACH: 7 PATCH, EXTENDED RELEASE TOPICAL at 10:17

## 2019-12-09 NOTE — PDOC PROGRESS REPORT
Subjective


Progress Note for:: 12/09/19


Subjective:: 





The patient is doing well.  No acute events overnight.  She ambulated with PT 

yesterday morning and had some difficulty but was improved by the afternoon.  R

eports multiple episodes of walking With improved ability.


Reason For Visit: 


RIGHT INTERTROCHANTERIC HIP FRACTURE








Physical Exam


Vital Signs: 


                                        











Temp Pulse Resp BP Pulse Ox


 


 99.0 F   88   16   126/63 H  92 


 


 12/09/19 16:29  12/09/19 16:29  12/09/19 16:29  12/09/19 16:29  12/09/19 16:29








                                 Intake & Output











 12/08/19 12/09/19 12/10/19





 06:59 06:59 06:59


 


Intake Total 3887 3530 


 


Output Total 2460 850 


 


Balance 1427 2680 


 


Weight 50.1 kg 52.5 kg 











Physical Exam: 





General appearance: PRESENT: no acute distress, cooperative, well-nourished


Head exam: PRESENT: atraumatic, normocephalic


Eye exam: PRESENT: EOMI


Ear exam: PRESENT: normal external ear exam


Mouth exam: PRESENT: neck supple


Neck exam: ABSENT: tracheal deviation


Respiratory exam: PRESENT: symmetrical, unlabored.  ABSENT: accessory muscle 

use, wheezes


Pulses: PRESENT: normal radial pulses, normal dorsalis pedis pulse


Vascular exam: PRESENT: normal capillary refill


GI/Abdominal exam: ABSENT: distended, firm


Extremities exam: PRESENT: full ROM of bilateral shoulders, elbows wrists, 

knees, hips and ankles without pain


Musculoskeletal exam: PRESENT: full ROM, normal inspection of all 4 extremities 

aside from that noted below. 


Neurological exam: PRESENT: alert, awake, oriented to person, oriented to place,

oriented to time


Psychiatric exam: PRESENT: appropriate affect.  ABSENT: agitated


Focused psych exam: ABSENT: catatonic


Skin exam: PRESENT: intact.  ABSENT: dry





All as above aside from that noted in the HPI and the following:





-Pulses 2+ distally


-Compartments soft


-Wound clean dry and intact no drainage in appropriate swelling for postop day


-Sensation grossly intact to L3-4-5 S1


-Motor grossly intact to EHL TA gastroc and quad


- Able to perform quad extension and elevate heel off of bed. 








Results


Laboratory Results: 


                                        





                                 12/07/19 03:57 





                                 12/07/19 03:57 








Impressions: 


                                        





Fluoroscopy  12/07/19 00:00


IMPRESSION:  IMAGE(S) OBTAINED DURING PROCEDURE.


 








Pelvis X-Ray  12/07/19 00:00


IMPRESSION:  SATISFACTORY POSTOPERATIVE RIGHT HIP.


 








Hip/Pelvis X-Ray  12/07/19 02:07


IMPRESSION:


 


Nondisplaced right intertrochanteric fracture


 


 


copyright 2011 [employer]- All Rights Reserved


 








Chest X-Ray  12/07/19 03:39


IMPRESSION:


 


No acute cardiopulmonary abnormality


 


 


copyright 2011 [employer]- All Rights Reserved


 














Assessment & Plan





- Diagnosis


(1) Closed intertrochanteric fracture of right hip


Qualifiers: 


   Encounter type: initial encounter   Fracture alignment: nondisplaced   

Qualified Code(s): S72.144A - Nondisplaced intertrochanteric fracture of right 

femur, initial encounter for closed fracture   


Is this a current diagnosis for this admission?: Yes   


Plan: 


Aspirin daily for DVT prophylaxis


Physical therapy and Occupational Therapy to help her get out of bed daily


-Weightbearing as tolerated


-Continue current pain management


-Change dressings as needed


-She is to follow-up in my clinic in 10 to 14 days for further x-rays and wound 

evaluation, she is to call my office for an appointment














(2) Closed right hip fracture


Qualifiers: 


   Encounter type: initial encounter   Qualified Code(s): S72.001A - Fracture of

unspecified part of neck of right femur, initial encounter for closed fracture  




Is this a current diagnosis for this admission?: Yes   





(3) Bronchitis


Is this a current diagnosis for this admission?: Yes   





(4) Hypertension


Qualifiers: 


   Hypertension type: essential hypertension   Qualified Code(s): I10 - 

Essential (primary) hypertension   


Is this a current diagnosis for this admission?: Yes   





(5) Tobacco abuse


Is this a current diagnosis for this admission?: Yes   





- Time


Time Spent with patient: Less than 15 minutes

## 2019-12-10 NOTE — PDOC DISCHARGE SUMMARY
Impression





- Admit/DC Date/PCP


Admission Date/Primary Care Provider: 


  12/07/19 05:24





  TASHI CURRY NP





Discharge Date: 12/09/19





- Discharge Diagnosis


(1) Closed intertrochanteric fracture of right hip


Is this a current diagnosis for this admission?: Yes   





(2) Closed right hip fracture


Is this a current diagnosis for this admission?: Yes   





(3) Bronchitis


Is this a current diagnosis for this admission?: Yes   





(4) Hypertension


Is this a current diagnosis for this admission?: Yes   





(5) Tobacco abuse


Is this a current diagnosis for this admission?: Yes   





- Assessment


Summary: 


The patient was admitted for a right hip surgery for an intertrochanteric 

fracture.  After surgery she was admitted to the floor where she did well.  She 

was walking with physical therapy on postoperative day #1 but did struggle 

slightly in the morning and it was determined that she needed another day.  Her 

pain was well controlled she had no acute events or complications the course of 

her stay.  Towards the end of her postoperative day 1 she was ambulating much 

better and actually getting herself to the bathroom and back.  On postop day #2 

she was doing well without any new events or complications and no acute changes 

overnight.  She continues do well physical therapy who deemed her stable for 

discharge home.  She was discharged home on postoperative day #2 stable 

condition.





- Additional Information


Resuscitation Status: Full Code


Discharge Diet: As Tolerated


Discharge Activity: Activity As Tolerated, Balance Activity w/Rest, Keep Legs 

Elevated, Slowly Increase Activity, No tub bath, Walk Frequently


Referrals: 


LUIS HOSKINS JR, DO [ACTIVE PROVISIONAL STAFF] -  (Their office will contact 

you with the date and time of the follow-up appointment.)


Prescriptions: 


Aspirin [Aspirin 325 mg Tablet] 325 mg PO DAILY 42 Days #42 tablet


Oxycodone HCl [Oxy-Ir 5 mg Tablet] 5 mg PO Q6HP PRN 20 Days #30 tablet


 PRN Reason: 


Acetaminophen [Tylenol 325 mg Tablet] 650 mg PO Q8 PRN #60 tablet


 PRN Reason: 


Tramadol HCl [Ultram 50 mg Tablet] 50 mg PO Q4HP PRN #20 tablet


 PRN Reason: For Pain Scale 2-3


Home Medications: 








Albuterol Sulfate [Ventolin 0.083% Neb 2.5 mg/3 mL Ampul] 1 vial NEB RTQ4HP PRN 

12/07/19 


Amlodipine Besylate [Norvasc 5 mg Tablet] 5 mg PO DAILY 12/07/19 


Ipratropium Bromide [Atrovent Hfa Inhalation Aerosol 12.9 gm Mdi] 2 puff IH QAM 

12/07/19 


Ipratropium Bromide [Atrovent Hfa] 1 puff IH BIDP PRN 12/07/19 


Lisinopril [Prinivil 10 mg Tablet] 40 mg PO DAILY 12/07/19 


Acetaminophen [Tylenol 325 mg Tablet] 650 mg PO Q8 PRN #60 tablet 12/08/19 


Aspirin [Aspirin 325 mg Tablet] 325 mg PO DAILY 42 Days #42 tablet 12/08/19 


Oxycodone HCl [Oxy-Ir 5 mg Tablet] 5 mg PO Q6HP PRN 20 Days #30 tablet 12/08/19 


Tramadol HCl [Ultram 50 mg Tablet] 50 mg PO Q4HP PRN #20 tablet 12/08/19 











History of Present Illiness


History of Present Illness: 





The patient was admitted to the hospital after she fell at home.  She had severe

pain and inability to bear weight.  She was found to have a right intertro

chanteric minimally displaced hip fracture.  After discussing risks and benefits

of operative and nonoperative treatment the patient opted to proceed with 

operative treatment.  She has been to the hospital for operative management as 

well as perioperative medical management and physical therapy.





Physical Exam


Vital Signs: 


                                        











Temp Pulse Resp BP Pulse Ox


 


 99.0 F   88   16   126/63 H  92 


 


 12/09/19 16:29  12/09/19 16:29  12/09/19 16:29  12/09/19 16:29  12/09/19 16:29








                                 Intake & Output











 12/09/19 12/10/19 12/11/19





 06:59 06:59 06:59


 


Intake Total 3530 480 


 


Output Total 850  


 


Balance 2680 480 


 


Weight 52.5 kg 52.5 kg 














Results


Laboratory Results: 


                                        











WBC  13.7 10^3/uL (4.0-10.5)  H  12/07/19  03:57    


 


RBC  4.12 10^6/uL (3.72-5.28)   12/07/19  03:57    


 


Hgb  13.8 g/dL (12.0-15.5)   12/07/19  03:57    


 


Hct  40.0 % (36.0-47.0)   12/07/19  03:57    


 


MCV  97 fl (80-97)   12/07/19  03:57    


 


MCH  33.5 pg (27.0-33.4)  H  12/07/19  03:57    


 


MCHC  34.4 g/dL (32.0-36.0)   12/07/19  03:57    


 


RDW  13.7 % (11.5-14.0)   12/07/19  03:57    


 


Plt Count  322 10^3/uL (150-450)   12/07/19  03:57    


 


Lymph % (Auto)  13.8 % (13-45)   12/07/19  03:57    


 


Mono % (Auto)  6.4 % (3-13)   12/07/19  03:57    


 


Eos % (Auto)  1.3 % (0-6)   12/07/19  03:57    


 


Baso % (Auto)  1.0 % (0-2)   12/07/19  03:57    


 


Absolute Neuts (auto)  10.6 10^3/uL (1.7-8.2)  H  12/07/19  03:57    


 


Absolute Lymphs (auto)  1.9 10^3/uL (0.5-4.7)   12/07/19  03:57    


 


Absolute Monos (auto)  0.9 10^3/uL (0.1-1.4)   12/07/19  03:57    


 


Absolute Eos (auto)  0.2 10^3/uL (0.0-0.6)   12/07/19  03:57    


 


Absolute Basos (auto)  0.1 10^3/uL (0.0-0.2)   12/07/19  03:57    


 


Seg Neutrophils %  77.5 % (42-78)   12/07/19  03:57    


 


PT  13.2 SEC (11.4-15.4)   12/07/19  03:57    


 


INR  1.00   12/07/19  03:57    


 


APTT  31.3 SEC (23.5-35.8)   12/07/19  03:57    


 


Sodium  141.8 mmol/L (137-145)   12/07/19  03:57    


 


Potassium  3.9 mmol/L (3.6-5.0)   12/07/19  03:57    


 


Chloride  107 mmol/L ()   12/07/19  03:57    


 


Carbon Dioxide  25 mmol/L (22-30)   12/07/19  03:57    


 


Anion Gap  10  (5-19)   12/07/19  03:57    


 


BUN  10 mg/dL (7-20)   12/07/19  03:57    


 


Creatinine  0.89 mg/dL (0.52-1.25)   12/07/19  03:57    


 


Est GFR ( Amer)  > 60  (>60)   12/07/19  03:57    


 


Est GFR (MDRD) Non-Af  > 60  (>60)   12/07/19  03:57    


 


Glucose  76 mg/dL ()   12/07/19  03:57    


 


Calcium  9.7 mg/dL (8.4-10.2)   12/07/19  03:57    


 


Total Bilirubin  0.4 mg/dL (0.2-1.3)   12/07/19  03:57    


 


Direct Bilirubin  0.2 mg/dL (0.0-0.4)   12/07/19  03:57    


 


Neonat Total Bilirubin  Not Reportable   12/07/19  03:57    


 


Neonat Direct Bilirubin  Not Reportable   12/07/19  03:57    


 


Neonat Indirect Bili  Not Reportable   12/07/19  03:57    


 


AST  31 U/L (14-36)   12/07/19  03:57    


 


ALT  16 U/L (<35)   12/07/19  03:57    


 


Alkaline Phosphatase  111 U/L ()   12/07/19  03:57    


 


Total Protein  6.7 g/dL (6.3-8.2)   12/07/19  03:57    


 


Albumin  3.9 g/dL (3.5-5.0)   12/07/19  03:57    


 


Urine Color  YELLOW   12/07/19  04:32    


 


Urine Appearance  CLEAR   12/07/19  04:32    


 


Urine pH  5.0  (5.0-9.0)   12/07/19  04:32    


 


Ur Specific Gravity  1.012   12/07/19  04:32    


 


Urine Protein  NEGATIVE mg/dL (NEGATIVE)   12/07/19  04:32    


 


Urine Glucose (UA)  NEGATIVE mg/dL (NEGATIVE)   12/07/19  04:32    


 


Urine Ketones  NEGATIVE mg/dL (NEGATIVE)   12/07/19  04:32    


 


Urine Blood  NEGATIVE  (NEGATIVE)   12/07/19  04:32    


 


Urine Nitrite  NEGATIVE  (NEGATIVE)   12/07/19  04:32    


 


Urine Bilirubin  NEGATIVE  (NEGATIVE)   12/07/19  04:32    


 


Urine Urobilinogen  NEGATIVE mg/dL (<2.0)   12/07/19  04:32    


 


Ur Leukocyte Esterase  NEGATIVE  (NEGATIVE)   12/07/19  04:32    


 


Urine WBC (Auto)  2 /HPF  12/07/19  04:32    


 


Urine RBC (Auto)  1 /HPF  12/07/19  04:32    


 


U Hyaline Cast (Auto)  25 /LPF  12/07/19  04:32    


 


Urine Mucus (Auto)  RARE /LPF  12/07/19  04:32    


 


Urine Ascorbic Acid  NEGATIVE  (NEGATIVE)   12/07/19  04:32    











Impressions: 


                                        





Fluoroscopy  12/07/19 00:00


IMPRESSION:  IMAGE(S) OBTAINED DURING PROCEDURE.


 








Hip/Pelvis X-Ray  12/07/19 00:00


IMPRESSION:  IMAGE(S) OBTAINED DURING PROCEDURE.


 








Pelvis X-Ray  12/07/19 00:00


IMPRESSION:  SATISFACTORY POSTOPERATIVE RIGHT HIP.


 








Hip/Pelvis X-Ray  12/07/19 02:07


IMPRESSION:


 


Nondisplaced right intertrochanteric fracture


 


 


copyright 2011 [employer]- All Rights Reserved


 








Chest X-Ray  12/07/19 03:39


IMPRESSION:


 


No acute cardiopulmonary abnormality


 


 


copyright 2011 [employer]- All Rights Reserved


 














Stroke


Is this a Stroke Patient?: No





Acute Heart Failure





- **


Is this a Heart Failure Patient?: No